# Patient Record
Sex: FEMALE | Race: WHITE | Employment: UNEMPLOYED | URBAN - METROPOLITAN AREA
[De-identification: names, ages, dates, MRNs, and addresses within clinical notes are randomized per-mention and may not be internally consistent; named-entity substitution may affect disease eponyms.]

---

## 2022-01-01 ENCOUNTER — APPOINTMENT (OUTPATIENT)
Dept: INTERVENTIONAL RADIOLOGY/VASCULAR | Age: 57
DRG: 388 | End: 2022-01-01
Attending: COLON & RECTAL SURGERY
Payer: COMMERCIAL

## 2022-01-01 ENCOUNTER — ANESTHESIA EVENT (OUTPATIENT)
Dept: CARDIOLOGY UNIT | Age: 57
DRG: 388 | End: 2022-01-01
Payer: COMMERCIAL

## 2022-01-01 ENCOUNTER — APPOINTMENT (OUTPATIENT)
Dept: CT IMAGING | Age: 57
DRG: 388 | End: 2022-01-01
Attending: HOSPITALIST
Payer: COMMERCIAL

## 2022-01-01 ENCOUNTER — APPOINTMENT (OUTPATIENT)
Dept: ULTRASOUND IMAGING | Age: 57
DRG: 388 | End: 2022-01-01
Attending: INTERNAL MEDICINE
Payer: COMMERCIAL

## 2022-01-01 ENCOUNTER — APPOINTMENT (OUTPATIENT)
Dept: CT IMAGING | Age: 57
DRG: 388 | End: 2022-01-01
Attending: STUDENT IN AN ORGANIZED HEALTH CARE EDUCATION/TRAINING PROGRAM
Payer: COMMERCIAL

## 2022-01-01 ENCOUNTER — APPOINTMENT (OUTPATIENT)
Dept: GENERAL RADIOLOGY | Age: 57
DRG: 388 | End: 2022-01-01
Attending: COLON & RECTAL SURGERY
Payer: COMMERCIAL

## 2022-01-01 ENCOUNTER — APPOINTMENT (OUTPATIENT)
Dept: GENERAL RADIOLOGY | Age: 57
DRG: 388 | End: 2022-01-01
Attending: HOSPITALIST
Payer: COMMERCIAL

## 2022-01-01 ENCOUNTER — HOSPITAL ENCOUNTER (INPATIENT)
Age: 57
LOS: 2 days | DRG: 388 | End: 2022-09-21
Attending: STUDENT IN AN ORGANIZED HEALTH CARE EDUCATION/TRAINING PROGRAM | Admitting: COLON & RECTAL SURGERY
Payer: COMMERCIAL

## 2022-01-01 ENCOUNTER — ANESTHESIA (OUTPATIENT)
Dept: CARDIOLOGY UNIT | Age: 57
DRG: 388 | End: 2022-01-01
Payer: COMMERCIAL

## 2022-01-01 ENCOUNTER — APPOINTMENT (OUTPATIENT)
Dept: GENERAL RADIOLOGY | Age: 57
DRG: 388 | End: 2022-01-01
Attending: INTERNAL MEDICINE
Payer: COMMERCIAL

## 2022-01-01 VITALS
WEIGHT: 218.03 LBS | SYSTOLIC BLOOD PRESSURE: 81 MMHG | HEART RATE: 50 BPM | BODY MASS INDEX: 32.29 KG/M2 | RESPIRATION RATE: 36 BRPM | TEMPERATURE: 99.6 F | HEIGHT: 69 IN | DIASTOLIC BLOOD PRESSURE: 27 MMHG | OXYGEN SATURATION: 49 %

## 2022-01-01 DIAGNOSIS — K56.7 ILEUS OF UNSPECIFIED TYPE (HCC): Primary | ICD-10-CM

## 2022-01-01 LAB
ALBUMIN SERPL-MCNC: 1.6 G/DL (ref 3.5–5)
ALBUMIN SERPL-MCNC: 2.1 G/DL (ref 3.5–5)
ALBUMIN SERPL-MCNC: 3 G/DL (ref 3.5–5)
ALBUMIN SERPL-MCNC: 3.5 G/DL (ref 3.5–5)
ALBUMIN/GLOB SERPL: 0.7 {RATIO} (ref 1.1–2.2)
ALBUMIN/GLOB SERPL: 0.7 {RATIO} (ref 1.1–2.2)
ALBUMIN/GLOB SERPL: 1 {RATIO} (ref 1.1–2.2)
ALBUMIN/GLOB SERPL: 1 {RATIO} (ref 1.1–2.2)
ALBUMIN/GLOB SERPL: 1.1 {RATIO} (ref 1.1–2.2)
ALBUMIN/GLOB SERPL: 1.2 {RATIO} (ref 1.1–2.2)
ALP SERPL-CCNC: 130 U/L (ref 45–117)
ALP SERPL-CCNC: 199 U/L (ref 45–117)
ALP SERPL-CCNC: 239 U/L (ref 45–117)
ALP SERPL-CCNC: 379 U/L (ref 45–117)
ALP SERPL-CCNC: 393 U/L (ref 45–117)
ALP SERPL-CCNC: 93 U/L (ref 45–117)
ALT SERPL-CCNC: 209 U/L (ref 12–78)
ALT SERPL-CCNC: 271 U/L (ref 12–78)
ALT SERPL-CCNC: 62 U/L (ref 12–78)
ALT SERPL-CCNC: 71 U/L (ref 12–78)
ALT SERPL-CCNC: 728 U/L (ref 12–78)
ALT SERPL-CCNC: 732 U/L (ref 12–78)
AMMONIA PLAS-SCNC: 239 UMOL/L
AMMONIA PLAS-SCNC: 56 UMOL/L
AMPHET UR QL SCN: NEGATIVE
ANION GAP SERPL CALC-SCNC: 12 MMOL/L (ref 5–15)
ANION GAP SERPL CALC-SCNC: 12 MMOL/L (ref 5–15)
ANION GAP SERPL CALC-SCNC: 13 MMOL/L (ref 5–15)
ANION GAP SERPL CALC-SCNC: 14 MMOL/L (ref 5–15)
ANION GAP SERPL CALC-SCNC: 14 MMOL/L (ref 5–15)
ANION GAP SERPL CALC-SCNC: 16 MMOL/L (ref 5–15)
ANION GAP SERPL CALC-SCNC: 19 MMOL/L (ref 5–15)
APAP SERPL-MCNC: <2 UG/ML (ref 10–30)
APPEARANCE UR: ABNORMAL
ARTERIAL PATENCY WRIST A: ABNORMAL
ARTERIAL PATENCY WRIST A: YES
AST SERPL W P-5'-P-CCNC: 41 U/L (ref 15–37)
AST SERPL W P-5'-P-CCNC: 545 U/L (ref 15–37)
AST SERPL W P-5'-P-CCNC: 778 U/L (ref 15–37)
AST SERPL W P-5'-P-CCNC: 96 U/L (ref 15–37)
AST SERPL W P-5'-P-CCNC: >2000 U/L (ref 15–37)
AST SERPL W P-5'-P-CCNC: >2000 U/L (ref 15–37)
BACTERIA URNS QL MICRO: ABNORMAL /HPF
BARBITURATES UR QL SCN: NEGATIVE
BASE DEFICIT BLDA-SCNC: 13.4 MMOL/L
BASE DEFICIT BLDA-SCNC: 13.9 MMOL/L
BASE DEFICIT BLDA-SCNC: 15 MMOL/L
BASE DEFICIT BLDA-SCNC: 22.2 MMOL/L
BASOPHILS # BLD: 0 K/UL (ref 0–0.1)
BASOPHILS # BLD: 0.1 K/UL (ref 0–0.1)
BASOPHILS NFR BLD: 0 % (ref 0–1)
BASOPHILS NFR BLD: 1 % (ref 0–1)
BDY SITE: ABNORMAL
BENZODIAZ UR QL: NEGATIVE
BILIRUB DIRECT SERPL-MCNC: 1.6 MG/DL (ref 0–0.2)
BILIRUB SERPL-MCNC: 0.9 MG/DL (ref 0.2–1)
BILIRUB SERPL-MCNC: 1 MG/DL (ref 0.2–1)
BILIRUB SERPL-MCNC: 1.4 MG/DL (ref 0.2–1)
BILIRUB SERPL-MCNC: 1.6 MG/DL (ref 0.2–1)
BILIRUB SERPL-MCNC: 2.1 MG/DL (ref 0.2–1)
BILIRUB SERPL-MCNC: 2.1 MG/DL (ref 0.2–1)
BILIRUB UR QL: ABNORMAL
BODY TEMPERATURE: 100.3
BODY TEMPERATURE: 100.3
BODY TEMPERATURE: 102.7
BODY TEMPERATURE: 98.6
BUN SERPL-MCNC: 21 MG/DL (ref 6–20)
BUN SERPL-MCNC: 34 MG/DL (ref 6–20)
BUN SERPL-MCNC: 40 MG/DL (ref 6–20)
BUN SERPL-MCNC: 52 MG/DL (ref 6–20)
BUN SERPL-MCNC: 53 MG/DL (ref 6–20)
BUN SERPL-MCNC: 57 MG/DL (ref 6–20)
BUN SERPL-MCNC: 59 MG/DL (ref 6–20)
BUN SERPL-MCNC: 61 MG/DL (ref 6–20)
BUN SERPL-MCNC: 62 MG/DL (ref 6–20)
BUN/CREAT SERPL: 12 (ref 12–20)
BUN/CREAT SERPL: 12 (ref 12–20)
BUN/CREAT SERPL: 14 (ref 12–20)
BUN/CREAT SERPL: 15 (ref 12–20)
BUN/CREAT SERPL: 16 (ref 12–20)
CA-I BLD-MCNC: 11 MG/DL (ref 8.5–10.1)
CA-I BLD-MCNC: 6.5 MG/DL (ref 8.5–10.1)
CA-I BLD-MCNC: 7.3 MG/DL (ref 8.5–10.1)
CA-I BLD-MCNC: 7.4 MG/DL (ref 8.5–10.1)
CA-I BLD-MCNC: 7.5 MG/DL (ref 8.5–10.1)
CA-I BLD-MCNC: 7.5 MG/DL (ref 8.5–10.1)
CA-I BLD-MCNC: 7.6 MG/DL (ref 8.5–10.1)
CA-I BLD-MCNC: 8.7 MG/DL (ref 8.5–10.1)
CA-I BLD-MCNC: 9.8 MG/DL (ref 8.5–10.1)
CANNABINOIDS UR QL SCN: POSITIVE
CHLORIDE SERPL-SCNC: 102 MMOL/L (ref 97–108)
CHLORIDE SERPL-SCNC: 103 MMOL/L (ref 97–108)
CHLORIDE SERPL-SCNC: 104 MMOL/L (ref 97–108)
CHLORIDE SERPL-SCNC: 104 MMOL/L (ref 97–108)
CHLORIDE SERPL-SCNC: 107 MMOL/L (ref 97–108)
CK SERPL-CCNC: ABNORMAL U/L (ref 26–192)
CO2 SERPL-SCNC: 14 MMOL/L (ref 21–32)
CO2 SERPL-SCNC: 15 MMOL/L (ref 21–32)
CO2 SERPL-SCNC: 18 MMOL/L (ref 21–32)
CO2 SERPL-SCNC: 18 MMOL/L (ref 21–32)
CO2 SERPL-SCNC: 19 MMOL/L (ref 21–32)
CO2 SERPL-SCNC: 19 MMOL/L (ref 21–32)
CO2 SERPL-SCNC: 21 MMOL/L (ref 21–32)
COCAINE UR QL SCN: NEGATIVE
COHGB MFR BLD: 0.9 % (ref 1–2)
COHGB MFR BLD: 1 % (ref 1–2)
COHGB MFR BLD: 1.2 % (ref 1–2)
COHGB MFR BLD: 1.7 % (ref 1–2)
COLOR UR: ABNORMAL
CREAT SERPL-MCNC: 1.82 MG/DL (ref 0.55–1.02)
CREAT SERPL-MCNC: 2.75 MG/DL (ref 0.55–1.02)
CREAT SERPL-MCNC: 2.87 MG/DL (ref 0.55–1.02)
CREAT SERPL-MCNC: 3.32 MG/DL (ref 0.55–1.02)
CREAT SERPL-MCNC: 3.33 MG/DL (ref 0.55–1.02)
CREAT SERPL-MCNC: 3.65 MG/DL (ref 0.55–1.02)
CREAT SERPL-MCNC: 3.95 MG/DL (ref 0.55–1.02)
CREAT SERPL-MCNC: 4.51 MG/DL (ref 0.55–1.02)
CREAT SERPL-MCNC: 4.52 MG/DL (ref 0.55–1.02)
DIFFERENTIAL METHOD BLD: ABNORMAL
DRUG SCRN COMMENT,DRGCM: ABNORMAL
EOSINOPHIL # BLD: 0 K/UL (ref 0–0.4)
EOSINOPHIL NFR BLD: 0 % (ref 0–7)
ERYTHROCYTE [DISTWIDTH] IN BLOOD BY AUTOMATED COUNT: 13.6 % (ref 11.5–14.5)
ERYTHROCYTE [DISTWIDTH] IN BLOOD BY AUTOMATED COUNT: 13.7 % (ref 11.5–14.5)
ERYTHROCYTE [DISTWIDTH] IN BLOOD BY AUTOMATED COUNT: 13.8 % (ref 11.5–14.5)
ERYTHROCYTE [DISTWIDTH] IN BLOOD BY AUTOMATED COUNT: 13.8 % (ref 11.5–14.5)
ERYTHROCYTE [DISTWIDTH] IN BLOOD BY AUTOMATED COUNT: 14.5 % (ref 11.5–14.5)
EST. AVERAGE GLUCOSE BLD GHB EST-MCNC: 140 MG/DL
FIO2 ON VENT: 100 %
FIO2 ON VENT: 100 %
FIO2 ON VENT: 50 %
FIO2 ON VENT: 50 %
GAS FLOW.O2 SETTING OXYMISER: 18 L/MIN
GAS FLOW.O2 SETTING OXYMISER: 30 L/MIN
GLOBULIN SER CALC-MCNC: 2 G/DL (ref 2–4)
GLOBULIN SER CALC-MCNC: 2.1 G/DL (ref 2–4)
GLOBULIN SER CALC-MCNC: 2.3 G/DL (ref 2–4)
GLOBULIN SER CALC-MCNC: 2.4 G/DL (ref 2–4)
GLOBULIN SER CALC-MCNC: 2.6 G/DL (ref 2–4)
GLOBULIN SER CALC-MCNC: 3.4 G/DL (ref 2–4)
GLUCOSE BLD STRIP.AUTO-MCNC: 120 MG/DL (ref 65–100)
GLUCOSE BLD STRIP.AUTO-MCNC: 139 MG/DL (ref 65–100)
GLUCOSE BLD STRIP.AUTO-MCNC: 174 MG/DL (ref 65–100)
GLUCOSE BLD STRIP.AUTO-MCNC: 266 MG/DL (ref 65–100)
GLUCOSE BLD STRIP.AUTO-MCNC: 30 MG/DL (ref 65–100)
GLUCOSE BLD STRIP.AUTO-MCNC: 34 MG/DL (ref 65–100)
GLUCOSE BLD STRIP.AUTO-MCNC: 35 MG/DL (ref 65–100)
GLUCOSE BLD STRIP.AUTO-MCNC: 38 MG/DL (ref 65–100)
GLUCOSE BLD STRIP.AUTO-MCNC: 392 MG/DL (ref 65–100)
GLUCOSE BLD STRIP.AUTO-MCNC: 44 MG/DL (ref 65–100)
GLUCOSE BLD STRIP.AUTO-MCNC: 46 MG/DL (ref 65–100)
GLUCOSE BLD STRIP.AUTO-MCNC: 69 MG/DL (ref 65–100)
GLUCOSE BLD STRIP.AUTO-MCNC: 79 MG/DL (ref 65–100)
GLUCOSE BLD STRIP.AUTO-MCNC: 84 MG/DL (ref 65–100)
GLUCOSE BLD STRIP.AUTO-MCNC: <10 MG/DL (ref 65–100)
GLUCOSE SERPL-MCNC: 227 MG/DL (ref 65–100)
GLUCOSE SERPL-MCNC: 262 MG/DL (ref 65–100)
GLUCOSE SERPL-MCNC: 315 MG/DL (ref 65–100)
GLUCOSE SERPL-MCNC: 397 MG/DL (ref 65–100)
GLUCOSE SERPL-MCNC: 65 MG/DL (ref 65–100)
GLUCOSE SERPL-MCNC: 66 MG/DL (ref 65–100)
GLUCOSE SERPL-MCNC: 67 MG/DL (ref 65–100)
GLUCOSE SERPL-MCNC: 96 MG/DL (ref 65–100)
GLUCOSE SERPL-MCNC: 98 MG/DL (ref 65–100)
GLUCOSE UR STRIP.AUTO-MCNC: 100 MG/DL
HBA1C MFR BLD: 6.5 % (ref 4–5.6)
HCO3 BLDA-SCNC: 14 MMOL/L (ref 22–26)
HCO3 BLDA-SCNC: 15 MMOL/L (ref 22–26)
HCO3 BLDA-SCNC: 16 MMOL/L (ref 22–26)
HCO3 BLDA-SCNC: 8 MMOL/L (ref 22–26)
HCT VFR BLD AUTO: 35.2 % (ref 35–47)
HCT VFR BLD AUTO: 42.7 % (ref 35–47)
HCT VFR BLD AUTO: 42.7 % (ref 35–47)
HCT VFR BLD AUTO: 50.1 % (ref 35–47)
HCT VFR BLD AUTO: 53.5 % (ref 35–47)
HCT VFR BLD AUTO: 55.7 % (ref 35–47)
HGB BLD-MCNC: 11.6 G/DL (ref 11.5–16)
HGB BLD-MCNC: 14.3 G/DL (ref 11.5–16)
HGB BLD-MCNC: 14.6 G/DL (ref 11.5–16)
HGB BLD-MCNC: 16.8 G/DL (ref 11.5–16)
HGB BLD-MCNC: 18.2 G/DL (ref 11.5–16)
HGB BLD-MCNC: 19 G/DL (ref 11.5–16)
HGB UR QL STRIP: ABNORMAL
IMM GRANULOCYTES # BLD AUTO: 0 K/UL
IMM GRANULOCYTES # BLD AUTO: 0 K/UL (ref 0–0.04)
IMM GRANULOCYTES NFR BLD AUTO: 0 %
IMM GRANULOCYTES NFR BLD AUTO: 0 % (ref 0–0.5)
KETONES UR QL STRIP.AUTO: 15 MG/DL
LACTATE SERPL-SCNC: 14.3 MMOL/L (ref 0.4–2)
LACTATE SERPL-SCNC: 5.8 MMOL/L (ref 0.4–2)
LACTATE SERPL-SCNC: 5.9 MMOL/L (ref 0.4–2)
LACTATE SERPL-SCNC: 7.7 MMOL/L (ref 0.4–2)
LEUKOCYTE ESTERASE UR QL STRIP.AUTO: ABNORMAL
LIPASE SERPL-CCNC: 23 U/L (ref 73–393)
LYMPHOCYTES # BLD: 0.7 K/UL (ref 0.8–3.5)
LYMPHOCYTES # BLD: 0.7 K/UL (ref 0.8–3.5)
LYMPHOCYTES # BLD: 1.3 K/UL (ref 0.8–3.5)
LYMPHOCYTES # BLD: 1.5 K/UL (ref 0.8–3.5)
LYMPHOCYTES NFR BLD: 14 % (ref 12–49)
LYMPHOCYTES NFR BLD: 18 % (ref 12–49)
LYMPHOCYTES NFR BLD: 19 % (ref 12–49)
LYMPHOCYTES NFR BLD: 24 % (ref 12–49)
MAGNESIUM SERPL-MCNC: 2.1 MG/DL (ref 1.6–2.4)
MCH RBC QN AUTO: 29.9 PG (ref 26–34)
MCH RBC QN AUTO: 30.2 PG (ref 26–34)
MCH RBC QN AUTO: 30.4 PG (ref 26–34)
MCH RBC QN AUTO: 30.8 PG (ref 26–34)
MCH RBC QN AUTO: 30.9 PG (ref 26–34)
MCHC RBC AUTO-ENTMCNC: 33 G/DL (ref 30–36.5)
MCHC RBC AUTO-ENTMCNC: 33.5 G/DL (ref 30–36.5)
MCHC RBC AUTO-ENTMCNC: 34 G/DL (ref 30–36.5)
MCHC RBC AUTO-ENTMCNC: 34.1 G/DL (ref 30–36.5)
MCHC RBC AUTO-ENTMCNC: 34.2 G/DL (ref 30–36.5)
MCV RBC AUTO: 88.4 FL (ref 80–99)
MCV RBC AUTO: 89.3 FL (ref 80–99)
MCV RBC AUTO: 89.5 FL (ref 80–99)
MCV RBC AUTO: 90.3 FL (ref 80–99)
MCV RBC AUTO: 93.6 FL (ref 80–99)
METAMYELOCYTES NFR BLD MANUAL: 15 %
METAMYELOCYTES NFR BLD MANUAL: 20 %
METHADONE UR QL: NEGATIVE
METHGB MFR BLD: 0 % (ref 0–1.4)
METHGB MFR BLD: 0 % (ref 0–1.4)
METHGB MFR BLD: 0.3 % (ref 0–1.4)
METHGB MFR BLD: 0.3 % (ref 0–1.4)
MONOCYTES # BLD: 0.1 K/UL (ref 0–1)
MONOCYTES # BLD: 0.2 K/UL (ref 0–1)
MONOCYTES # BLD: 0.3 K/UL (ref 0–1)
MONOCYTES # BLD: 0.6 K/UL (ref 0–1)
MONOCYTES NFR BLD: 10 % (ref 5–13)
MONOCYTES NFR BLD: 2 % (ref 5–13)
MONOCYTES NFR BLD: 3 % (ref 5–13)
MONOCYTES NFR BLD: 6 % (ref 5–13)
MYELOCYTES NFR BLD MANUAL: 3 %
MYELOCYTES NFR BLD MANUAL: 6 %
NEUTS BAND NFR BLD MANUAL: 10 % (ref 0–6)
NEUTS BAND NFR BLD MANUAL: 7 % (ref 0–6)
NEUTS SEG # BLD: 1.9 K/UL (ref 1.8–8)
NEUTS SEG # BLD: 2.3 K/UL (ref 1.8–8)
NEUTS SEG # BLD: 3.9 K/UL (ref 1.8–8)
NEUTS SEG # BLD: 8 K/UL (ref 1.8–8)
NEUTS SEG NFR BLD: 45 % (ref 32–75)
NEUTS SEG NFR BLD: 52 % (ref 32–75)
NEUTS SEG NFR BLD: 66 % (ref 32–75)
NEUTS SEG NFR BLD: 79 % (ref 32–75)
NITRITE UR QL STRIP.AUTO: NEGATIVE
NRBC # BLD: 0 K/UL (ref 0–0.01)
NRBC # BLD: 0.04 K/UL (ref 0–0.01)
NRBC BLD-RTO: 0 PER 100 WBC
NRBC BLD-RTO: 1 PER 100 WBC
OPIATES UR QL: NEGATIVE
OXYHGB MFR BLD: 93.1 % (ref 95–99)
OXYHGB MFR BLD: 93.9 % (ref 95–99)
OXYHGB MFR BLD: 95 % (ref 95–99)
OXYHGB MFR BLD: 97.7 % (ref 95–99)
PCO2 BLDA: 37 MMHG (ref 35–45)
PCO2 BLDA: 43 MMHG (ref 35–45)
PCO2 BLDA: 46 MMHG (ref 35–45)
PCO2 BLDA: 61 MMHG (ref 35–45)
PCP UR QL: NEGATIVE
PEEP RESPIRATORY: 5 CM[H2O]
PERFORMED BY, TECHID: ABNORMAL
PERFORMED BY, TECHID: NORMAL
PH BLDA: 6.98 [PH] (ref 7.35–7.45)
PH BLDA: 7.06 [PH] (ref 7.35–7.45)
PH BLDA: 7.1 [PH] (ref 7.35–7.45)
PH BLDA: 7.15 [PH] (ref 7.35–7.45)
PH UR STRIP: 5 [PH]
PHOSPHATE SERPL-MCNC: 11.3 MG/DL (ref 2.6–4.7)
PHOSPHATE SERPL-MCNC: 4.4 MG/DL (ref 2.6–4.7)
PHOSPHATE SERPL-MCNC: 8.2 MG/DL (ref 2.6–4.7)
PLATELET # BLD AUTO: 142 K/UL (ref 150–400)
PLATELET # BLD AUTO: 229 K/UL (ref 150–400)
PLATELET # BLD AUTO: 53 K/UL (ref 150–400)
PLATELET # BLD AUTO: 76 K/UL (ref 150–400)
PLATELET # BLD AUTO: 87 K/UL (ref 150–400)
PMV BLD AUTO: 10 FL (ref 8.9–12.9)
PMV BLD AUTO: 10.9 FL (ref 8.9–12.9)
PMV BLD AUTO: 11 FL (ref 8.9–12.9)
PMV BLD AUTO: 11.4 FL (ref 8.9–12.9)
PMV BLD AUTO: 11.5 FL (ref 8.9–12.9)
PO2 BLDA: 116 MMHG (ref 80–100)
PO2 BLDA: 195 MMHG (ref 80–100)
PO2 BLDA: 84 MMHG (ref 80–100)
PO2 BLDA: 98 MMHG (ref 80–100)
POTASSIUM SERPL-SCNC: 3.9 MMOL/L (ref 3.5–5.1)
POTASSIUM SERPL-SCNC: 4.6 MMOL/L (ref 3.5–5.1)
POTASSIUM SERPL-SCNC: 5.2 MMOL/L (ref 3.5–5.1)
POTASSIUM SERPL-SCNC: 6 MMOL/L (ref 3.5–5.1)
POTASSIUM SERPL-SCNC: 7.3 MMOL/L (ref 3.5–5.1)
POTASSIUM SERPL-SCNC: 8.9 MMOL/L (ref 3.5–5.1)
POTASSIUM SERPL-SCNC: 8.9 MMOL/L (ref 3.5–5.1)
PROT SERPL-MCNC: 3.9 G/DL (ref 6.4–8.2)
PROT SERPL-MCNC: 4 G/DL (ref 6.4–8.2)
PROT SERPL-MCNC: 4.1 G/DL (ref 6.4–8.2)
PROT SERPL-MCNC: 4.2 G/DL (ref 6.4–8.2)
PROT SERPL-MCNC: 5.6 G/DL (ref 6.4–8.2)
PROT SERPL-MCNC: 6.9 G/DL (ref 6.4–8.2)
PROT UR STRIP-MCNC: 100 MG/DL
RBC # BLD AUTO: 3.76 M/UL (ref 3.8–5.2)
RBC # BLD AUTO: 4.78 M/UL (ref 3.8–5.2)
RBC # BLD AUTO: 4.83 M/UL (ref 3.8–5.2)
RBC # BLD AUTO: 5.98 M/UL (ref 3.8–5.2)
RBC # BLD AUTO: 6.17 M/UL (ref 3.8–5.2)
RBC #/AREA URNS HPF: ABNORMAL /HPF (ref 0–5)
RBC MORPH BLD: ABNORMAL
SAO2 % BLD: 95 % (ref 95–99)
SAO2 % BLD: 95 % (ref 95–99)
SAO2 % BLD: 96 % (ref 95–99)
SAO2 % BLD: 99 % (ref 95–99)
SAO2% DEVICE SAO2% SENSOR NAME: ABNORMAL
SERVICE CMNT-IMP: ABNORMAL
SODIUM SERPL-SCNC: 133 MMOL/L (ref 136–145)
SODIUM SERPL-SCNC: 135 MMOL/L (ref 136–145)
SODIUM SERPL-SCNC: 136 MMOL/L (ref 136–145)
SODIUM SERPL-SCNC: 136 MMOL/L (ref 136–145)
SODIUM SERPL-SCNC: 137 MMOL/L (ref 136–145)
SODIUM SERPL-SCNC: 137 MMOL/L (ref 136–145)
SODIUM SERPL-SCNC: 138 MMOL/L (ref 136–145)
SODIUM SERPL-SCNC: 139 MMOL/L (ref 136–145)
SODIUM SERPL-SCNC: 139 MMOL/L (ref 136–145)
SP GR UR REFRACTOMETRY: 1.02 (ref 1–1.03)
SPECIMEN SITE: ABNORMAL
UROBILINOGEN UR QL STRIP.AUTO: 4 EU/DL (ref 0.2–1)
VENTILATION MODE VENT: ABNORMAL
VT SETTING VENT: 500 ML
VT SETTING VENT: 550 ML
WBC # BLD AUTO: 10.2 K/UL (ref 3.6–11)
WBC # BLD AUTO: 2.9 K/UL (ref 3.6–11)
WBC # BLD AUTO: 3.7 K/UL (ref 3.6–11)
WBC # BLD AUTO: 3.9 K/UL (ref 3.6–11)
WBC # BLD AUTO: 7.1 K/UL (ref 3.6–11)
WBC URNS QL MICRO: ABNORMAL /HPF (ref 0–4)
YEAST URNS QL MICRO: PRESENT

## 2022-01-01 PROCEDURE — 99285 EMERGENCY DEPT VISIT HI MDM: CPT

## 2022-01-01 PROCEDURE — 80069 RENAL FUNCTION PANEL: CPT

## 2022-01-01 PROCEDURE — 80053 COMPREHEN METABOLIC PANEL: CPT

## 2022-01-01 PROCEDURE — 82803 BLOOD GASES ANY COMBINATION: CPT

## 2022-01-01 PROCEDURE — 82962 GLUCOSE BLOOD TEST: CPT

## 2022-01-01 PROCEDURE — 74011250636 HC RX REV CODE- 250/636: Performed by: COLON & RECTAL SURGERY

## 2022-01-01 PROCEDURE — C1894 INTRO/SHEATH, NON-LASER: HCPCS

## 2022-01-01 PROCEDURE — 77010033678 HC OXYGEN DAILY

## 2022-01-01 PROCEDURE — 74011000250 HC RX REV CODE- 250: Performed by: HOSPITALIST

## 2022-01-01 PROCEDURE — 74176 CT ABD & PELVIS W/O CONTRAST: CPT

## 2022-01-01 PROCEDURE — 74011000250 HC RX REV CODE- 250: Performed by: INTERNAL MEDICINE

## 2022-01-01 PROCEDURE — 80143 DRUG ASSAY ACETAMINOPHEN: CPT

## 2022-01-01 PROCEDURE — 83690 ASSAY OF LIPASE: CPT

## 2022-01-01 PROCEDURE — 74011000250 HC RX REV CODE- 250: Performed by: COLON & RECTAL SURGERY

## 2022-01-01 PROCEDURE — 74011000258 HC RX REV CODE- 258: Performed by: INTERNAL MEDICINE

## 2022-01-01 PROCEDURE — 76937 US GUIDE VASCULAR ACCESS: CPT

## 2022-01-01 PROCEDURE — 87040 BLOOD CULTURE FOR BACTERIA: CPT

## 2022-01-01 PROCEDURE — 82550 ASSAY OF CK (CPK): CPT

## 2022-01-01 PROCEDURE — 5A1935Z RESPIRATORY VENTILATION, LESS THAN 24 CONSECUTIVE HOURS: ICD-10-PCS | Performed by: INTERNAL MEDICINE

## 2022-01-01 PROCEDURE — 82140 ASSAY OF AMMONIA: CPT

## 2022-01-01 PROCEDURE — 36415 COLL VENOUS BLD VENIPUNCTURE: CPT

## 2022-01-01 PROCEDURE — 85018 HEMOGLOBIN: CPT

## 2022-01-01 PROCEDURE — 80048 BASIC METABOLIC PNL TOTAL CA: CPT

## 2022-01-01 PROCEDURE — C9113 INJ PANTOPRAZOLE SODIUM, VIA: HCPCS | Performed by: INTERNAL MEDICINE

## 2022-01-01 PROCEDURE — 0BH17EZ INSERTION OF ENDOTRACHEAL AIRWAY INTO TRACHEA, VIA NATURAL OR ARTIFICIAL OPENING: ICD-10-PCS | Performed by: INTERNAL MEDICINE

## 2022-01-01 PROCEDURE — 71045 X-RAY EXAM CHEST 1 VIEW: CPT

## 2022-01-01 PROCEDURE — 94002 VENT MGMT INPAT INIT DAY: CPT

## 2022-01-01 PROCEDURE — 74011636637 HC RX REV CODE- 636/637: Performed by: COLON & RECTAL SURGERY

## 2022-01-01 PROCEDURE — 85027 COMPLETE CBC AUTOMATED: CPT

## 2022-01-01 PROCEDURE — 85025 COMPLETE CBC W/AUTO DIFF WBC: CPT

## 2022-01-01 PROCEDURE — 74011000258 HC RX REV CODE- 258: Performed by: COLON & RECTAL SURGERY

## 2022-01-01 PROCEDURE — 74011250636 HC RX REV CODE- 250/636: Performed by: INTERNAL MEDICINE

## 2022-01-01 PROCEDURE — 74011000250 HC RX REV CODE- 250

## 2022-01-01 PROCEDURE — P9047 ALBUMIN (HUMAN), 25%, 50ML: HCPCS | Performed by: INTERNAL MEDICINE

## 2022-01-01 PROCEDURE — 99221 1ST HOSP IP/OBS SF/LOW 40: CPT | Performed by: COLON & RECTAL SURGERY

## 2022-01-01 PROCEDURE — 74011250636 HC RX REV CODE- 250/636: Performed by: STUDENT IN AN ORGANIZED HEALTH CARE EDUCATION/TRAINING PROGRAM

## 2022-01-01 PROCEDURE — 65270000029 HC RM PRIVATE

## 2022-01-01 PROCEDURE — 83605 ASSAY OF LACTIC ACID: CPT

## 2022-01-01 PROCEDURE — 74018 RADEX ABDOMEN 1 VIEW: CPT

## 2022-01-01 PROCEDURE — 04HK33Z INSERTION OF INFUSION DEVICE INTO RIGHT FEMORAL ARTERY, PERCUTANEOUS APPROACH: ICD-10-PCS | Performed by: INTERNAL MEDICINE

## 2022-01-01 PROCEDURE — 74011636637 HC RX REV CODE- 636/637: Performed by: INTERNAL MEDICINE

## 2022-01-01 PROCEDURE — 02H633Z INSERTION OF INFUSION DEVICE INTO RIGHT ATRIUM, PERCUTANEOUS APPROACH: ICD-10-PCS | Performed by: RADIOLOGY

## 2022-01-01 PROCEDURE — 83735 ASSAY OF MAGNESIUM: CPT

## 2022-01-01 PROCEDURE — 99291 CRITICAL CARE FIRST HOUR: CPT | Performed by: COLON & RECTAL SURGERY

## 2022-01-01 PROCEDURE — 74011636637 HC RX REV CODE- 636/637: Performed by: HOSPITALIST

## 2022-01-01 PROCEDURE — 83036 HEMOGLOBIN GLYCOSYLATED A1C: CPT

## 2022-01-01 PROCEDURE — 36600 WITHDRAWAL OF ARTERIAL BLOOD: CPT

## 2022-01-01 PROCEDURE — 76700 US EXAM ABDOM COMPLETE: CPT

## 2022-01-01 PROCEDURE — 74011250636 HC RX REV CODE- 250/636: Performed by: HOSPITALIST

## 2022-01-01 PROCEDURE — 74011000258 HC RX REV CODE- 258: Performed by: HOSPITALIST

## 2022-01-01 PROCEDURE — 93005 ELECTROCARDIOGRAM TRACING: CPT

## 2022-01-01 PROCEDURE — 81001 URINALYSIS AUTO W/SCOPE: CPT

## 2022-01-01 PROCEDURE — 80307 DRUG TEST PRSMV CHEM ANLYZR: CPT

## 2022-01-01 PROCEDURE — 65610000006 HC RM INTENSIVE CARE

## 2022-01-01 PROCEDURE — 80076 HEPATIC FUNCTION PANEL: CPT

## 2022-01-01 RX ORDER — CLONAZEPAM 0.5 MG/1
0.5 TABLET ORAL
Status: DISCONTINUED | OUTPATIENT
Start: 2022-01-01 | End: 2022-01-01 | Stop reason: HOSPADM

## 2022-01-01 RX ORDER — HYDROCORTISONE SODIUM SUCCINATE 100 MG/2ML
50 INJECTION, POWDER, FOR SOLUTION INTRAMUSCULAR; INTRAVENOUS EVERY 6 HOURS
Status: DISCONTINUED | OUTPATIENT
Start: 2022-01-01 | End: 2022-01-01 | Stop reason: HOSPADM

## 2022-01-01 RX ORDER — SUCCINYLCHOLINE CHLORIDE 20 MG/ML INJECTION SOLUTION
SOLUTION AS NEEDED
Status: DISCONTINUED | OUTPATIENT
Start: 2022-01-01 | End: 2022-01-01 | Stop reason: HOSPADM

## 2022-01-01 RX ORDER — EPINEPHRINE 0.1 MG/ML
INJECTION INTRACARDIAC; INTRAVENOUS
Status: COMPLETED | OUTPATIENT
Start: 2022-01-01 | End: 2022-01-01

## 2022-01-01 RX ORDER — SODIUM CHLORIDE 9 MG/ML
75 INJECTION, SOLUTION INTRAVENOUS CONTINUOUS
Status: DISCONTINUED | OUTPATIENT
Start: 2022-01-01 | End: 2022-01-01

## 2022-01-01 RX ORDER — PROPOFOL 10 MG/ML
0-50 VIAL (ML) INTRAVENOUS
Status: DISCONTINUED | OUTPATIENT
Start: 2022-01-01 | End: 2022-01-01 | Stop reason: HOSPADM

## 2022-01-01 RX ORDER — MIDAZOLAM IN 0.9 % SOD.CHLORID 1 MG/ML
0-10 PLASTIC BAG, INJECTION (ML) INTRAVENOUS
Status: DISCONTINUED | OUTPATIENT
Start: 2022-01-01 | End: 2022-01-01 | Stop reason: HOSPADM

## 2022-01-01 RX ORDER — PROPOFOL 10 MG/ML
INJECTION, EMULSION INTRAVENOUS
Status: DISCONTINUED
Start: 2022-01-01 | End: 2022-01-01 | Stop reason: WASHOUT

## 2022-01-01 RX ORDER — ROCURONIUM BROMIDE 10 MG/ML
INJECTION, SOLUTION INTRAVENOUS
Status: COMPLETED
Start: 2022-01-01 | End: 2022-01-01

## 2022-01-01 RX ORDER — CALCIUM CHLORIDE INJECTION 100 MG/ML
INJECTION, SOLUTION INTRAVENOUS
Status: COMPLETED | OUTPATIENT
Start: 2022-01-01 | End: 2022-01-01

## 2022-01-01 RX ORDER — NOREPINEPHRINE BIT/0.9 % NACL 8 MG/250ML
.5-3 INFUSION BOTTLE (ML) INTRAVENOUS
Status: DISCONTINUED | OUTPATIENT
Start: 2022-01-01 | End: 2022-01-01

## 2022-01-01 RX ORDER — ALBUMIN HUMAN 250 G/1000ML
25 SOLUTION INTRAVENOUS EVERY 6 HOURS
Status: DISCONTINUED | OUTPATIENT
Start: 2022-01-01 | End: 2022-01-01 | Stop reason: HOSPADM

## 2022-01-01 RX ORDER — MAGNESIUM SULFATE 100 %
4 CRYSTALS MISCELLANEOUS AS NEEDED
Status: DISCONTINUED | OUTPATIENT
Start: 2022-01-01 | End: 2022-01-01

## 2022-01-01 RX ORDER — SODIUM BICARBONATE 1 MEQ/ML
SYRINGE (ML) INTRAVENOUS
Status: COMPLETED
Start: 2022-01-01 | End: 2022-01-01

## 2022-01-01 RX ORDER — SODIUM BICARBONATE 1 MEQ/ML
SYRINGE (ML) INTRAVENOUS
Status: DISCONTINUED
Start: 2022-01-01 | End: 2022-01-01 | Stop reason: WASHOUT

## 2022-01-01 RX ORDER — LIDOCAINE HCL/PF 100 MG/5ML
SYRINGE (ML) INTRAVENOUS
Status: COMPLETED
Start: 2022-01-01 | End: 2022-01-01

## 2022-01-01 RX ORDER — DEXTROSE MONOHYDRATE 100 MG/ML
0-250 INJECTION, SOLUTION INTRAVENOUS AS NEEDED
Status: DISCONTINUED | OUTPATIENT
Start: 2022-01-01 | End: 2022-01-01 | Stop reason: HOSPADM

## 2022-01-01 RX ORDER — SODIUM BICARBONATE 1 MEQ/ML
SYRINGE (ML) INTRAVENOUS
Status: COMPLETED | OUTPATIENT
Start: 2022-01-01 | End: 2022-01-01

## 2022-01-01 RX ORDER — SODIUM CHLORIDE, SODIUM LACTATE, POTASSIUM CHLORIDE, CALCIUM CHLORIDE 600; 310; 30; 20 MG/100ML; MG/100ML; MG/100ML; MG/100ML
125 INJECTION, SOLUTION INTRAVENOUS CONTINUOUS
Status: DISCONTINUED | OUTPATIENT
Start: 2022-01-01 | End: 2022-01-01

## 2022-01-01 RX ORDER — CHLORHEXIDINE GLUCONATE 1.2 MG/ML
15 RINSE ORAL EVERY 12 HOURS
Status: DISCONTINUED | OUTPATIENT
Start: 2022-01-01 | End: 2022-01-01 | Stop reason: HOSPADM

## 2022-01-01 RX ORDER — AMIODARONE HYDROCHLORIDE 150 MG/3ML
INJECTION, SOLUTION INTRAVENOUS
Status: COMPLETED | OUTPATIENT
Start: 2022-01-01 | End: 2022-01-01

## 2022-01-01 RX ORDER — SODIUM BICARBONATE 1 MEQ/ML
100 SYRINGE (ML) INTRAVENOUS ONCE
Status: COMPLETED | OUTPATIENT
Start: 2022-01-01 | End: 2022-01-01

## 2022-01-01 RX ORDER — SODIUM BICARBONATE 1 MEQ/ML
SYRINGE (ML) INTRAVENOUS
Status: DISCONTINUED
Start: 2022-01-01 | End: 2022-01-01 | Stop reason: HOSPADM

## 2022-01-01 RX ORDER — INSULIN LISPRO 100 [IU]/ML
INJECTION, SOLUTION INTRAVENOUS; SUBCUTANEOUS EVERY 4 HOURS
Status: DISCONTINUED | OUTPATIENT
Start: 2022-01-01 | End: 2022-01-01 | Stop reason: HOSPADM

## 2022-01-01 RX ORDER — CALCIUM CHLORIDE INJECTION 100 MG/ML
INJECTION, SOLUTION INTRAVENOUS
Status: DISCONTINUED
Start: 2022-01-01 | End: 2022-01-01 | Stop reason: HOSPADM

## 2022-01-01 RX ORDER — ETOMIDATE 2 MG/ML
INJECTION INTRAVENOUS AS NEEDED
Status: DISCONTINUED | OUTPATIENT
Start: 2022-01-01 | End: 2022-01-01 | Stop reason: HOSPADM

## 2022-01-01 RX ORDER — ROCURONIUM BROMIDE 10 MG/ML
INJECTION, SOLUTION INTRAVENOUS AS NEEDED
Status: DISCONTINUED | OUTPATIENT
Start: 2022-01-01 | End: 2022-01-01 | Stop reason: HOSPADM

## 2022-01-01 RX ORDER — INSULIN LISPRO 100 [IU]/ML
INJECTION, SOLUTION INTRAVENOUS; SUBCUTANEOUS
Status: DISCONTINUED | OUTPATIENT
Start: 2022-01-01 | End: 2022-01-01 | Stop reason: SDUPTHER

## 2022-01-01 RX ORDER — DIPHENHYDRAMINE HYDROCHLORIDE 50 MG/ML
12.5 INJECTION, SOLUTION INTRAMUSCULAR; INTRAVENOUS
Status: DISCONTINUED | OUTPATIENT
Start: 2022-01-01 | End: 2022-01-01 | Stop reason: HOSPADM

## 2022-01-01 RX ORDER — MIDAZOLAM HYDROCHLORIDE 1 MG/ML
4 INJECTION, SOLUTION INTRAMUSCULAR; INTRAVENOUS ONCE
Status: COMPLETED | OUTPATIENT
Start: 2022-01-01 | End: 2022-01-01

## 2022-01-01 RX ORDER — NOREPINEPHRINE BIT/0.9 % NACL 8 MG/250ML
.5-12 INFUSION BOTTLE (ML) INTRAVENOUS
Status: DISPENSED | OUTPATIENT
Start: 2022-01-01 | End: 2022-01-01

## 2022-01-01 RX ORDER — HYDROMORPHONE HYDROCHLORIDE 1 MG/ML
0.5 INJECTION, SOLUTION INTRAMUSCULAR; INTRAVENOUS; SUBCUTANEOUS
Status: DISCONTINUED | OUTPATIENT
Start: 2022-01-01 | End: 2022-01-01 | Stop reason: HOSPADM

## 2022-01-01 RX ORDER — PROPOFOL 10 MG/ML
0-50 VIAL (ML) INTRAVENOUS
Status: DISCONTINUED | OUTPATIENT
Start: 2022-01-01 | End: 2022-01-01

## 2022-01-01 RX ORDER — DEXTROSE 50 % IN WATER (D50W) INTRAVENOUS SYRINGE
Status: COMPLETED | OUTPATIENT
Start: 2022-01-01 | End: 2022-01-01

## 2022-01-01 RX ORDER — FLUCONAZOLE 2 MG/ML
200 INJECTION, SOLUTION INTRAVENOUS DAILY
Status: DISCONTINUED | OUTPATIENT
Start: 2022-01-01 | End: 2022-01-01 | Stop reason: HOSPADM

## 2022-01-01 RX ORDER — CALCIUM CHLORIDE INJECTION 100 MG/ML
INJECTION, SOLUTION INTRAVENOUS
Status: COMPLETED
Start: 2022-01-01 | End: 2022-01-01

## 2022-01-01 RX ORDER — DEXMEDETOMIDINE HYDROCHLORIDE 4 UG/ML
.1-1.5 INJECTION, SOLUTION INTRAVENOUS
Status: DISCONTINUED | OUTPATIENT
Start: 2022-01-01 | End: 2022-01-01 | Stop reason: HOSPADM

## 2022-01-01 RX ORDER — CALCIUM CHLORIDE INJECTION 100 MG/ML
1 INJECTION, SOLUTION INTRAVENOUS ONCE
Status: COMPLETED | OUTPATIENT
Start: 2022-01-01 | End: 2022-01-01

## 2022-01-01 RX ORDER — LIDOCAINE HYDROCHLORIDE 20 MG/ML
INJECTION, SOLUTION EPIDURAL; INFILTRATION; INTRACAUDAL; PERINEURAL AS NEEDED
Status: DISCONTINUED | OUTPATIENT
Start: 2022-01-01 | End: 2022-01-01 | Stop reason: HOSPADM

## 2022-01-01 RX ORDER — MAGNESIUM SULFATE 100 %
4 CRYSTALS MISCELLANEOUS AS NEEDED
Status: DISCONTINUED | OUTPATIENT
Start: 2022-01-01 | End: 2022-01-01 | Stop reason: HOSPADM

## 2022-01-01 RX ORDER — DEXTROSE MONOHYDRATE 100 MG/ML
INJECTION, SOLUTION INTRAVENOUS
Status: COMPLETED | OUTPATIENT
Start: 2022-01-01 | End: 2022-01-01

## 2022-01-01 RX ORDER — SUCCINYLCHOLINE CHLORIDE 20 MG/ML
INJECTION INTRAMUSCULAR; INTRAVENOUS
Status: COMPLETED
Start: 2022-01-01 | End: 2022-01-01

## 2022-01-01 RX ORDER — ETOMIDATE 2 MG/ML
INJECTION INTRAVENOUS
Status: COMPLETED
Start: 2022-01-01 | End: 2022-01-01

## 2022-01-01 RX ORDER — CALCIUM GLUCONATE 20 MG/ML
1 INJECTION, SOLUTION INTRAVENOUS ONCE
Status: DISCONTINUED | OUTPATIENT
Start: 2022-01-01 | End: 2022-01-01 | Stop reason: HOSPADM

## 2022-01-01 RX ORDER — DEXTROSE MONOHYDRATE 100 MG/ML
250 INJECTION, SOLUTION INTRAVENOUS ONCE
Status: COMPLETED | OUTPATIENT
Start: 2022-01-01 | End: 2022-01-01

## 2022-01-01 RX ADMIN — HYDROMORPHONE HYDROCHLORIDE 0.5 MG: 1 INJECTION, SOLUTION INTRAMUSCULAR; INTRAVENOUS; SUBCUTANEOUS at 03:17

## 2022-01-01 RX ADMIN — SODIUM BICARBONATE 100 MEQ: 84 INJECTION INTRAVENOUS at 12:56

## 2022-01-01 RX ADMIN — SODIUM CHLORIDE 1000 ML: 9 INJECTION, SOLUTION INTRAVENOUS at 23:46

## 2022-01-01 RX ADMIN — Medication 38 MCG/MIN: at 04:50

## 2022-01-01 RX ADMIN — SODIUM CHLORIDE 75 ML/HR: 9 INJECTION, SOLUTION INTRAVENOUS at 14:06

## 2022-01-01 RX ADMIN — LIDOCAINE HYDROCHLORIDE 100 MG: 20 INJECTION, SOLUTION EPIDURAL; INFILTRATION; INTRACAUDAL; PERINEURAL at 06:30

## 2022-01-01 RX ADMIN — SODIUM CHLORIDE, PRESERVATIVE FREE 1 G: 5 INJECTION INTRAVENOUS at 17:46

## 2022-01-01 RX ADMIN — Medication 60 MCG/MIN: at 10:49

## 2022-01-01 RX ADMIN — SODIUM CHLORIDE, PRESERVATIVE FREE 80 MG: 5 INJECTION INTRAVENOUS at 00:08

## 2022-01-01 RX ADMIN — PIPERACILLIN AND TAZOBACTAM 4.5 G: 4; .5 INJECTION, POWDER, FOR SOLUTION INTRAVENOUS at 08:24

## 2022-01-01 RX ADMIN — Medication 70 MCG/MIN: at 08:33

## 2022-01-01 RX ADMIN — EPINEPHRINE 1 MG: 0.1 INJECTION, SOLUTION INTRAVENOUS at 15:09

## 2022-01-01 RX ADMIN — INSULIN HUMAN 10 UNITS: 100 INJECTION, SOLUTION PARENTERAL at 12:00

## 2022-01-01 RX ADMIN — Medication 12 MCG/MIN: at 19:23

## 2022-01-01 RX ADMIN — DEXTROSE MONOHYDRATE 500 ML: 100 INJECTION, SOLUTION INTRAVENOUS at 15:07

## 2022-01-01 RX ADMIN — SODIUM BICARBONATE 100 MEQ: 84 INJECTION INTRAVENOUS at 01:46

## 2022-01-01 RX ADMIN — INSULIN HUMAN 10 UNITS: 100 INJECTION, SOLUTION PARENTERAL at 15:02

## 2022-01-01 RX ADMIN — CHLORHEXIDINE GLUCONATE 0.12% ORAL RINSE 15 ML: 1.2 LIQUID ORAL at 11:30

## 2022-01-01 RX ADMIN — CALCIUM CHLORIDE: 100 INJECTION, SOLUTION INTRAVENOUS at 12:00

## 2022-01-01 RX ADMIN — DEXTROSE MONOHYDRATE 50 ML: 25 INJECTION, SOLUTION INTRAVENOUS at 15:00

## 2022-01-01 RX ADMIN — DEXMEDETOMIDINE HYDROCHLORIDE 1 MCG/KG/HR: 4 INJECTION, SOLUTION INTRAVENOUS at 09:30

## 2022-01-01 RX ADMIN — DEXTROSE MONOHYDRATE 250 ML: 100 INJECTION, SOLUTION INTRAVENOUS at 01:00

## 2022-01-01 RX ADMIN — AMIODARONE HYDROCHLORIDE 150 MG: 50 INJECTION, SOLUTION INTRAVENOUS at 11:20

## 2022-01-01 RX ADMIN — VASOPRESSIN 0.04 UNITS/MIN: 20 INJECTION PARENTERAL at 09:51

## 2022-01-01 RX ADMIN — Medication 100 MEQ: at 01:46

## 2022-01-01 RX ADMIN — Medication 80 MCG/MIN: at 06:55

## 2022-01-01 RX ADMIN — ROCURONIUM BROMIDE 10 MG: 10 INJECTION, SOLUTION INTRAVENOUS at 06:30

## 2022-01-01 RX ADMIN — SUCCINYLCHOLINE CHLORIDE 20 MG/ML INJECTION SOLUTION 80 MG: SOLUTION at 06:30

## 2022-01-01 RX ADMIN — Medication 300 MCG/MIN: at 13:36

## 2022-01-01 RX ADMIN — EPINEPHRINE 5 MCG/MIN: 1 INJECTION INTRAMUSCULAR; INTRAVENOUS; SUBCUTANEOUS at 12:45

## 2022-01-01 RX ADMIN — ETOMIDATE 10 MCG: 2 INJECTION INTRAVENOUS at 06:30

## 2022-01-01 RX ADMIN — Medication 2 UNITS: at 14:21

## 2022-01-01 RX ADMIN — EPINEPHRINE 1 MG: 0.1 INJECTION, SOLUTION INTRAVENOUS at 14:58

## 2022-01-01 RX ADMIN — FLUCONAZOLE 200 MG: 2 INJECTION, SOLUTION INTRAVENOUS at 14:18

## 2022-01-01 RX ADMIN — VANCOMYCIN HYDROCHLORIDE 2000 MG: 10 INJECTION, POWDER, LYOPHILIZED, FOR SOLUTION INTRAVENOUS at 13:23

## 2022-01-01 RX ADMIN — SODIUM BICARBONATE: 84 INJECTION, SOLUTION INTRAVENOUS at 05:37

## 2022-01-01 RX ADMIN — INSULIN LISPRO 10 UNITS: 100 INJECTION, SOLUTION INTRAVENOUS; SUBCUTANEOUS at 08:24

## 2022-01-01 RX ADMIN — PROPOFOL 20 MCG/KG/MIN: 10 INJECTION, EMULSION INTRAVENOUS at 08:18

## 2022-01-01 RX ADMIN — CALCIUM CHLORIDE 1 G: 100 INJECTION, SOLUTION INTRAVENOUS at 11:18

## 2022-01-01 RX ADMIN — Medication 30 MCG/MIN: at 08:28

## 2022-01-01 RX ADMIN — CEFTRIAXONE SODIUM 1 G: 1 INJECTION, POWDER, FOR SOLUTION INTRAMUSCULAR; INTRAVENOUS at 14:04

## 2022-01-01 RX ADMIN — DEXTROSE MONOHYDRATE 250 ML: 100 INJECTION, SOLUTION INTRAVENOUS at 11:35

## 2022-01-01 RX ADMIN — MEROPENEM 1 G: 1 INJECTION, POWDER, FOR SOLUTION INTRAVENOUS at 05:28

## 2022-01-01 RX ADMIN — Medication 8 MCG/MIN: at 16:24

## 2022-01-01 RX ADMIN — LIDOCAINE HYDROCHLORIDE: 20 INJECTION, SOLUTION INTRAVENOUS at 07:00

## 2022-01-01 RX ADMIN — Medication 5 MCG/MIN: at 12:16

## 2022-01-01 RX ADMIN — SODIUM BICARBONATE 50 MEQ: 84 INJECTION INTRAVENOUS at 11:13

## 2022-01-01 RX ADMIN — HYDROMORPHONE HYDROCHLORIDE 0.5 MG: 1 INJECTION, SOLUTION INTRAMUSCULAR; INTRAVENOUS; SUBCUTANEOUS at 22:38

## 2022-01-01 RX ADMIN — SODIUM BICARBONATE: 84 INJECTION, SOLUTION INTRAVENOUS at 02:09

## 2022-01-01 RX ADMIN — DEXTROSE MONOHYDRATE 10 MCG/MIN: 50 INJECTION, SOLUTION INTRAVENOUS at 12:10

## 2022-01-01 RX ADMIN — Medication 5 MG/HR: at 13:00

## 2022-01-01 RX ADMIN — EPINEPHRINE 1 MG: 0.1 INJECTION, SOLUTION INTRAVENOUS at 11:14

## 2022-01-01 RX ADMIN — Medication 34 MCG/MIN: at 03:36

## 2022-01-01 RX ADMIN — SODIUM CHLORIDE 1000 ML: 9 INJECTION, SOLUTION INTRAVENOUS at 08:24

## 2022-01-01 RX ADMIN — HYDROMORPHONE HYDROCHLORIDE 0.5 MG: 1 INJECTION, SOLUTION INTRAMUSCULAR; INTRAVENOUS; SUBCUTANEOUS at 17:43

## 2022-01-01 RX ADMIN — EPINEPHRINE 1 MG: 0.1 INJECTION, SOLUTION INTRAVENOUS at 14:54

## 2022-01-01 RX ADMIN — MIDAZOLAM HYDROCHLORIDE 4 MG: 1 INJECTION, SOLUTION INTRAMUSCULAR; INTRAVENOUS at 10:49

## 2022-01-01 RX ADMIN — SODIUM BICARBONATE: 84 INJECTION INTRAVENOUS at 09:00

## 2022-01-01 RX ADMIN — INSULIN LISPRO 6 UNITS: 100 INJECTION, SOLUTION INTRAVENOUS; SUBCUTANEOUS at 10:34

## 2022-01-01 RX ADMIN — Medication 36 MCG/MIN: at 03:50

## 2022-01-01 RX ADMIN — SODIUM CHLORIDE, POTASSIUM CHLORIDE, SODIUM LACTATE AND CALCIUM CHLORIDE 125 ML/HR: 600; 310; 30; 20 INJECTION, SOLUTION INTRAVENOUS at 23:55

## 2022-01-01 RX ADMIN — ALBUMIN (HUMAN) 25 G: 0.25 INJECTION, SOLUTION INTRAVENOUS at 13:12

## 2022-01-01 RX ADMIN — Medication 24 MCG/MIN: at 23:18

## 2022-01-01 RX ADMIN — SODIUM CHLORIDE, POTASSIUM CHLORIDE, SODIUM LACTATE AND CALCIUM CHLORIDE 100 ML/HR: 600; 310; 30; 20 INJECTION, SOLUTION INTRAVENOUS at 02:14

## 2022-01-01 RX ADMIN — SODIUM CHLORIDE 1000 ML: 9 INJECTION, SOLUTION INTRAVENOUS at 07:01

## 2022-01-01 RX ADMIN — SODIUM BICARBONATE 50 MEQ: 84 INJECTION INTRAVENOUS at 08:43

## 2022-01-01 RX ADMIN — EPINEPHRINE 1 MG: 0.1 INJECTION, SOLUTION INTRAVENOUS at 15:06

## 2022-01-01 RX ADMIN — EPINEPHRINE 1 MG: 0.1 INJECTION, SOLUTION INTRAVENOUS at 08:43

## 2022-01-01 RX ADMIN — SODIUM CHLORIDE 1000 ML: 9 INJECTION, SOLUTION INTRAVENOUS at 17:39

## 2022-01-01 RX ADMIN — Medication 32 MCG/MIN: at 03:22

## 2022-01-01 RX ADMIN — DIPHENHYDRAMINE HYDROCHLORIDE 12.5 MG: 50 INJECTION, SOLUTION INTRAMUSCULAR; INTRAVENOUS at 12:16

## 2022-01-01 RX ADMIN — CALCIUM CHLORIDE 1 G: 100 INJECTION, SOLUTION INTRAVENOUS at 12:00

## 2022-01-01 RX ADMIN — HYDROCORTISONE SODIUM SUCCINATE 50 MG: 100 INJECTION, POWDER, FOR SOLUTION INTRAMUSCULAR; INTRAVENOUS at 13:09

## 2022-01-01 RX ADMIN — EPINEPHRINE 1 MG: 0.1 INJECTION, SOLUTION INTRAVENOUS at 15:03

## 2022-01-01 RX ADMIN — EPINEPHRINE 1 MG: 0.1 INJECTION, SOLUTION INTRAVENOUS at 11:11

## 2022-01-01 RX ADMIN — SODIUM CHLORIDE 1000 ML: 9 INJECTION, SOLUTION INTRAVENOUS at 14:06

## 2022-01-01 RX ADMIN — HYDROMORPHONE HYDROCHLORIDE 0.5 MG: 1 INJECTION, SOLUTION INTRAMUSCULAR; INTRAVENOUS; SUBCUTANEOUS at 12:23

## 2022-01-01 RX ADMIN — DEXTROSE MONOHYDRATE 250 ML: 100 INJECTION, SOLUTION INTRAVENOUS at 04:40

## 2022-01-01 RX ADMIN — SODIUM BICARBONATE 100 MEQ: 84 INJECTION INTRAVENOUS at 05:01

## 2022-09-19 PROBLEM — K56.609 BOWEL OBSTRUCTION (HCC): Status: ACTIVE | Noted: 2022-01-01

## 2022-09-19 NOTE — Clinical Note
Status[de-identified] INPATIENT [101]   Type of Bed: Surgical [18]   Inpatient Hospitalization Certified Necessary for the Following Reasons: 3.  Patient receiving treatment that can only be provided in an inpatient setting (further clarification in H&P documentation)   Admitting Diagnosis: Bowel obstruction [facility]) [[id number]]   Admitting Physician: Vishnu Dodd [[id number]]   Attending Physician: Vishnu Dodd [[id number]]   Estimated Length of Stay: 2 Midnights   Discharge Plan[de-identified] Home with Office Follow-up

## 2022-09-20 NOTE — ED TRIAGE NOTES
Patient called EMS for abdominal pain, patient reports abdominal distention and back pain says that she has been taking a lot of imodium the last few days

## 2022-09-20 NOTE — PROGRESS NOTES
TRANSFER - IN REPORT:    Verbal report received from RODRIGUE Luciano(name) on France Krishnamurthy  being received from [facility](unit) for change in patient condition(hypotension, AMS)      Report consisted of patients Situation, Background, Assessment and   Recommendations(SBAR). Information from the following report(s) SBAR and Kardex was reviewed with the receiving nurse. Opportunity for questions and clarification was provided. Assessment completed upon patients arrival to unit and care assumed. Domingo Montiel

## 2022-09-20 NOTE — PROGRESS NOTES
4182: Chart reviewed. Per MD notes, patient NPO with q6h soap suds enemas, IVF and IV ABX (Zosyn). CM will complete DCP assessment once on unit. CM will continue to follow patient and recs of medical team.    1000:     Reason for Admission:  Bowel Obstruction                     RUR Score:          8%           Plan for utilizing home health:      Not at this time    PCP: First and Last name:  None     Name of Practice:    Are you a current patient: Yes/No:    Approximate date of last visit:    Can you participate in a virtual visit with your PCP:                     Current Advanced Directive/Advance Care Plan: Full Code      Healthcare Decision Maker:   Click here to complete [address] including selection of the Healthcare Decision Maker Relationship (ie \"Primary\")           Ian Guevara, Friend [phone]                  Transition of Care Plan:   CM met with patient at bedside for DCP. Patient reports living with \"someone\" in a one story home with 3-4 steps to the entrance. Prior to admission, patient relays being independent with ADLs. Patient states she has not driven in some time. No DME. No previous HH/IRF/SNF. Medications obtained from Jaquelin Mcadams. When medically stable, patient states Lincoln Agarwal will transport her home. CM will continue to follow patient and recs of medical team.    Current Dispo: Discharge Home Self-Care.

## 2022-09-20 NOTE — H&P
History and Physical    Subjective:     Vee Sharma is a 59 y.o.  female who presented to the emergency department with complaints of abdominal pain and distention. She has been experiencing cramping abdominal pain which is worsened over the past 3 days. Apparently she has been taking Imodium because she is staying at a friend's house as she is unable to use the bathroom for social reasons. As result she has not had a bowel movement in 3 days. She does state that with the pain she has nausea but denies any vomiting. She also states that she has worsening abdominal distention. In the emergency department she had blood work done which was remarkable for CO2 15, BUN 21, creatinine 1.82 (have no baseline). Alysis was significant for 3+ bacteria, 20-50 white blood cell count, small amount of blood. CT scan which demonstrated dilated fluid-filled proximal colon with moderate amount of stool in the sigmoid colon and rectum. Small volume ascites noted. Hepatic steatosis noted consistent with a diagnosis of Beltran. I personally viewed the images myself. She has a mass medical history significant for diabetes, Seun Ser with cirrhosis although she states she has not seen a hepatologist in quite some time, fibromyalgia. Her medications are trospium 60 mg daily before breakfast for overactive bladder, clonazepam 0.5 mg every afternoon as needed, fluoxetine 60 mg daily, metformin 1000 mg twice daily. Past Medical History:   Diagnosis Date    Diabetes ([facility] [address]     Fatty liver disease, nonalcoholic     Fibromyalgia       No past surgical history on file. No family history on file.    Social History     Tobacco Use    Smoking status: Every Day     Types: Cigarettes    Smokeless tobacco: Never   Substance Use Topics    Alcohol use: Not Currently       Prior to Admission medications    Not on File     Allergies   Allergen Reactions    Morphine Unknown (comments)        Review of Systems:  A comprehensive review of systems was negative except for that written in the History of Present Illness. Objective: Intake and Output:    No intake/output data recorded. No intake/output data recorded. Physical Exam:   Physical Exam  Constitutional:       General: She is in acute distress. Appearance: She is obese. She is not ill-appearing. HENT:      Head: Normocephalic and atraumatic. Cardiovascular:      Rate and Rhythm: Normal rate and regular rhythm. Pulmonary:      Effort: Pulmonary effort is normal.      Breath sounds: Normal breath sounds. Abdominal:      General: There is distension. Palpations: Abdomen is soft. There is no mass. Tenderness: There is abdominal tenderness. There is no guarding or rebound. Hernia: No hernia is present. Musculoskeletal:         General: Normal range of motion. Cervical back: Normal range of motion and neck supple. Skin:     General: Skin is warm and dry. Neurological:      General: No focal deficit present. Mental Status: She is alert and oriented to person, place, and time. Psychiatric:         Mood and Affect: Mood normal.         Thought Content: Thought content normal.         Judgment: Judgment normal.        Data Review:   Recent Results (from the past 24 hour(s))   CBC WITH AUTOMATED DIFF    Collection Time: 09/19/22  9:12 PM   Result Value Ref Range    WBC 10.2 3.6 - 11.0 K/uL    RBC 6.17 (H) 3.80 - 5.20 M/uL    HGB 19.0 (H) 11.5 - 16.0 g/dL    HCT 55.7 (H) 35.0 - 47.0 %    MCV 90.3 80.0 - 99.0 FL    MCH 30.8 26.0 - 34.0 PG    MCHC 34.1 30.0 - 36.5 g/dL    RDW 13.8 11.5 - 14.5 %    PLATELET 045 461 - 981 K/uL    MPV 10.0 8.9 - 12.9 FL    NRBC 0.0 0.0  WBC    ABSOLUTE NRBC 0.00 0.00 - 0.01 K/uL    NEUTROPHILS 79 (H) 32 - 75 %    LYMPHOCYTES 14 12 - 49 %    MONOCYTES 6 5 - 13 %    EOSINOPHILS 0 0 - 7 %    BASOPHILS 1 0 - 1 %    IMMATURE GRANULOCYTES 0 0 - 0.5 %    ABS. NEUTROPHILS 8.0 1.8 - 8.0 K/UL    ABS. LYMPHOCYTES 1.5 0.8 - 3.5 K/UL    ABS. MONOCYTES 0.6 0.0 - 1.0 K/UL    ABS. EOSINOPHILS 0.0 0.0 - 0.4 K/UL    ABS. BASOPHILS 0.1 0.0 - 0.1 K/UL    ABS. IMM. GRANS. 0.0 0.00 - 0.04 K/UL    DF AUTOMATED     METABOLIC PANEL, COMPREHENSIVE    Collection Time: 09/19/22  9:12 PM   Result Value Ref Range    Sodium 133 (L) 136 - 145 mmol/L    Potassium 3.9 3.5 - 5.1 mmol/L    Chloride 102 97 - 108 mmol/L    CO2 15 (LL) 21 - 32 mmol/L    Anion gap 16 (H) 5 - 15 mmol/L    Glucose 397 (H) 65 - 100 mg/dL    BUN 21 (H) 6 - 20 mg/dL    Creatinine 1.82 (H) 0.55 - 1.02 mg/dL    BUN/Creatinine ratio 12 12 - 20      GFR est AA 35 (L) >60 ml/min/1.73m2    GFR est non-AA 29 (L) >60 ml/min/1.73m2    Calcium 11.0 (H) 8.5 - 10.1 mg/dL    Bilirubin, total 0.9 0.2 - 1.0 mg/dL    AST (SGOT) 41 (H) 15 - 37 U/L    ALT (SGPT) 62 12 - 78 U/L    Alk.  phosphatase 93 45 - 117 U/L    Protein, total 6.9 6.4 - 8.2 g/dL    Albumin 3.5 3.5 - 5.0 g/dL    Globulin 3.4 2.0 - 4.0 g/dL    A-G Ratio 1.0 (L) 1.1 - 2.2     LIPASE    Collection Time: 09/19/22  9:12 PM   Result Value Ref Range    Lipase 23 (L) 73 - 393 U/L   ACETAMINOPHEN    Collection Time: 09/20/22  3:27 AM   Result Value Ref Range    Acetaminophen level <2 (L) 10 - 30 ug/mL   URINALYSIS W/MICROSCOPIC    Collection Time: 09/20/22  3:30 AM   Result Value Ref Range    Color Laura      Appearance Cloudy (A) Clear      Specific gravity 1.025 1.003 - 1.030      pH (UA) 5.0      Protein 100 (A) Negative mg/dL    Glucose 100 (A) Negative mg/dL    Ketone 15 (A) Negative mg/dL    Bilirubin Moderate (A) Negative      Blood Small (A) Negative      Urobilinogen 4.0 (H) 0.2 - 1.0 EU/dL    Nitrites Negative Negative      Leukocyte Esterase Small (A) Negative      WBC 20-50 0 - 4 /hpf    RBC 5-10 0 - 5 /hpf    Bacteria 3+ (A) Negative /hpf    PRESUMPTIVE YEAST Present         Assessment:     Active Problems:    Bowel obstruction (HCC) (9/19/2022)        Plan:   Had extensive discussion with the patient and told her that her CT is consistent with large volume liquid and solid stool within the colon which is responsible for abdominal distention cramping pain. She has been taking Imodium around-the-clock to prevent bowel movements and has not had a bowel movement in now this is the fourth day. I told her we will start subset enemas every 6 hours. She is to remain n.p.o. with IV fluids. Repeat labs. I am not sure of her baseline creatinine was elevated 1.82. She is also acidotic with a bicarb of 15. We will start antibiotics for presumptive urinary tract infection. It was also noted there was a presumptive yeast present. We will also start an antifungal.  We will send urine for urine culture.

## 2022-09-20 NOTE — ED NOTES
NG tube attempted twice with size 16 and sizr 14 unsuccessfully, pt stated \" this is too painful and I cant do it. \" Dr. Wendy Resendiz called and notified

## 2022-09-20 NOTE — PROGRESS NOTES
Pt arrived at bedside. No NGT present, pt previously refusing placement. Educated patient on need for NGT, pt eventually agreed. Right nare NGT placed with 400ml of coffee ground stomach content drained. Pt hypotensive with SBP in 70s. Second IV access placed, Levo started. Pt c/o need to urinate, bladder scanner indicated 47ml of urine in bladder. Pain medication administered, Dr. Chapincito Brooks updated on patient condition. Will continue to monitor.

## 2022-09-20 NOTE — ED NOTES
TRANSFER - OUT REPORT:    Verbal report given to Nurse Prudencio Cohn (name) on Orlando Cline  being transferred to  [address] [place] [address] (unit) for routine progression of care       Report consisted of patients Situation, Background, Assessment and   Recommendations(SBAR). Information from the following report(s) SBAR, Kardex, MAR, and Recent Results was reviewed with the receiving nurse. Lines:   Peripheral IV 09/19/22 Right Antecubital (Active)   Site Assessment Clean, dry, & intact 09/19/22 2115   Phlebitis Assessment 0 09/19/22 2115   Infiltration Assessment 0 09/19/22 2115   Dressing Status Clean, dry, & intact 09/19/22 2115   Dressing Type Tape;Transparent 09/19/22 2115   Hub Color/Line Status Pink;Flushed;Patent 09/19/22 2115   Action Taken Blood drawn 09/19/22 2115   Alcohol Cap Used Yes 09/19/22 2115        Opportunity for questions and clarification was provided.       Patient transported with:   [employer]

## 2022-09-20 NOTE — PROGRESS NOTES
Dr. Sebastian Perkins at bedside. Updated on patient condition and stated concerns about pt's increased AMS, abdominal tenderness and worsening pallor. Received new orders for 1L bolus and requested soap enema be administered. Will continue to monitor.

## 2022-09-20 NOTE — CONSULTS
Progress Note    Patient: Nany Cates MRN: [id number]  SSN: [id number]    YOB: 1965  Age: 59 y.o. Sex: female      Admit Date: 9/19/2022    LOS: 1 day     Subjective:       57F, H/o fibromyalgia, fatty liver disease presented with abdominal pain abdominal pain and constipation with use of imodiium, CT scan showed colonic obstruction    HSOPIAYL COURSE:  LA 7.7, with MICHELA. Started with IVF, meropenem after being alergic to zosyn and started with fluconazole. UA suggestive of infection, might be a contaminent. UDS pending    Review of Systems   Constitutional:  Positive for activity change and appetite change. Negative for fatigue, fever and unexpected weight change. HENT:  Negative for congestion, facial swelling, postnasal drip, sinus pressure and trouble swallowing. Eyes:  Negative for discharge, redness and visual disturbance. Respiratory:  Negative for cough, chest tightness, shortness of breath and wheezing. Cardiovascular:  Positive for chest pain and palpitations. Negative for leg swelling. Gastrointestinal:  Positive for abdominal distention, abdominal pain, constipation, nausea and vomiting. Negative for diarrhea. Endocrine: Negative for cold intolerance, heat intolerance and polyuria. Genitourinary:  Negative for difficulty urinating and hematuria. Musculoskeletal:  Negative for arthralgias, back pain, gait problem, joint swelling and myalgias. Skin:  Negative for color change and pallor. Allergic/Immunologic: Negative for environmental allergies and food allergies. Neurological:  Positive for weakness. Negative for dizziness, tremors, seizures, facial asymmetry and headaches. Hematological:  Negative for adenopathy. Does not bruise/bleed easily.    Psychiatric/Behavioral:  Positive for confusion      Objective:     Vitals:    09/20/22 1230 09/20/22 1300 09/20/22 1400 09/20/22 1500   BP: 97/70 (!) 87/61 102/62    Pulse: (!) 116 (!) 120 (!) 120    Resp: (!) 34 (!) 37 28    Temp: 98.7 °F (37.1 °C)   97.7 °F (36.5 °C)   SpO2: 97% 95% 96%    Weight:       Height:            Intake and Output:  Current Shift: 09/20 0701 - 09/20 1900  In: -   Out: 400   Last three shifts: No intake/output data recorded. Physical Exam  Vitals and nursing note reviewed. Constitutional:       Appearance: She is obese. She is ill-appearing. HENT:      Head: Normocephalic and atraumatic. Nose: Nose normal.      Mouth/Throat:      Mouth: Mucous membranes are moist.      Pharynx: Oropharynx is clear. Eyes:      Conjunctiva/sclera: Conjunctivae normal.   Cardiovascular:      Rate and Rhythm: Regular rhythm. Tachycardia present. Pulses: Normal pulses. Heart sounds: Normal heart sounds. Pulmonary:      Effort: Pulmonary effort is normal.      Breath sounds: Normal breath sounds. Abdominal:      General: There is distension. Musculoskeletal:      Right lower leg: No edema. Left lower leg: No edema. Skin:     General: Skin is warm and dry. Coloration: Skin is not pale. Findings: No erythema. Neurological:      General: No focal deficit present. Mental Status: She is alert and oriented to person, place, and time. NG tube in place with coffee-ground aspirate       Lab/Data Review:  Recent Results (from the past 24 hour(s))   CBC WITH AUTOMATED DIFF    Collection Time: 09/19/22  9:12 PM   Result Value Ref Range    WBC 10.2 3.6 - 11.0 K/uL    RBC 6.17 (H) 3.80 - 5.20 M/uL    HGB 19.0 (H) 11.5 - 16.0 g/dL    HCT 55.7 (H) 35.0 - 47.0 %    MCV 90.3 80.0 - 99.0 FL    MCH 30.8 26.0 - 34.0 PG    MCHC 34.1 30.0 - 36.5 g/dL    RDW 13.8 11.5 - 14.5 %    PLATELET 256 641 - 079 K/uL    MPV 10.0 8.9 - 12.9 FL    NRBC 0.0 0.0  WBC    ABSOLUTE NRBC 0.00 0.00 - 0.01 K/uL    NEUTROPHILS 79 (H) 32 - 75 %    LYMPHOCYTES 14 12 - 49 %    MONOCYTES 6 5 - 13 %    EOSINOPHILS 0 0 - 7 %    BASOPHILS 1 0 - 1 %    IMMATURE GRANULOCYTES 0 0 - 0.5 %    ABS.  NEUTROPHILS 8.0 1.8 - 8.0 K/UL    ABS. LYMPHOCYTES 1.5 0.8 - 3.5 K/UL    ABS. MONOCYTES 0.6 0.0 - 1.0 K/UL    ABS. EOSINOPHILS 0.0 0.0 - 0.4 K/UL    ABS. BASOPHILS 0.1 0.0 - 0.1 K/UL    ABS. IMM. GRANS. 0.0 0.00 - 0.04 K/UL    DF AUTOMATED     METABOLIC PANEL, COMPREHENSIVE    Collection Time: 09/19/22  9:12 PM   Result Value Ref Range    Sodium 133 (L) 136 - 145 mmol/L    Potassium 3.9 3.5 - 5.1 mmol/L    Chloride 102 97 - 108 mmol/L    CO2 15 (LL) 21 - 32 mmol/L    Anion gap 16 (H) 5 - 15 mmol/L    Glucose 397 (H) 65 - 100 mg/dL    BUN 21 (H) 6 - 20 mg/dL    Creatinine 1.82 (H) 0.55 - 1.02 mg/dL    BUN/Creatinine ratio 12 12 - 20      GFR est AA 35 (L) >60 ml/min/1.73m2    GFR est non-AA 29 (L) >60 ml/min/1.73m2    Calcium 11.0 (H) 8.5 - 10.1 mg/dL    Bilirubin, total 0.9 0.2 - 1.0 mg/dL    AST (SGOT) 41 (H) 15 - 37 U/L    ALT (SGPT) 62 12 - 78 U/L    Alk.  phosphatase 93 45 - 117 U/L    Protein, total 6.9 6.4 - 8.2 g/dL    Albumin 3.5 3.5 - 5.0 g/dL    Globulin 3.4 2.0 - 4.0 g/dL    A-G Ratio 1.0 (L) 1.1 - 2.2     LIPASE    Collection Time: 09/19/22  9:12 PM   Result Value Ref Range    Lipase 23 (L) 73 - 393 U/L   ACETAMINOPHEN    Collection Time: 09/20/22  3:27 AM   Result Value Ref Range    Acetaminophen level <2 (L) 10 - 30 ug/mL   URINALYSIS W/MICROSCOPIC    Collection Time: 09/20/22  3:30 AM   Result Value Ref Range    Color Laura      Appearance Cloudy (A) Clear      Specific gravity 1.025 1.003 - 1.030      pH (UA) 5.0      Protein 100 (A) Negative mg/dL    Glucose 100 (A) Negative mg/dL    Ketone 15 (A) Negative mg/dL    Bilirubin Moderate (A) Negative      Blood Small (A) Negative      Urobilinogen 4.0 (H) 0.2 - 1.0 EU/dL    Nitrites Negative Negative      Leukocyte Esterase Small (A) Negative      WBC 20-50 0 - 4 /hpf    RBC 5-10 0 - 5 /hpf    Bacteria 3+ (A) Negative /hpf    PRESUMPTIVE YEAST Present     CBC WITH AUTOMATED DIFF    Collection Time: 09/20/22  7:58 AM   Result Value Ref Range    WBC 7.1 3.6 - 11.0 K/uL RBC 5.98 (H) 3.80 - 5.20 M/uL    HGB 18.2 (H) 11.5 - 16.0 g/dL    HCT 53.5 (H) 35.0 - 47.0 %    MCV 89.5 80.0 - 99.0 FL    MCH 30.4 26.0 - 34.0 PG    MCHC 34.0 30.0 - 36.5 g/dL    RDW 13.8 11.5 - 14.5 %    PLATELET 690 (L) 151 - 400 K/uL    MPV 10.9 8.9 - 12.9 FL    NRBC 0.0 0.0  WBC    ABSOLUTE NRBC 0.00 0.00 - 0.01 K/uL    NEUTROPHILS 45 32 - 75 %    BAND NEUTROPHILS 10 (H) 0 - 6 %    LYMPHOCYTES 19 12 - 49 %    MONOCYTES 3 (L) 5 - 13 %    EOSINOPHILS 0 0 - 7 %    BASOPHILS 0 0 - 1 %    METAMYELOCYTES 20 (H) 0 %    MYELOCYTES 3 (H) 0 %    IMMATURE GRANULOCYTES 0 %    ABS. NEUTROPHILS 3.9 1.8 - 8.0 K/UL    ABS. LYMPHOCYTES 1.3 0.8 - 3.5 K/UL    ABS. MONOCYTES 0.2 0.0 - 1.0 K/UL    ABS. EOSINOPHILS 0.0 0.0 - 0.4 K/UL    ABS. BASOPHILS 0.0 0.0 - 0.1 K/UL    ABS. IMM. GRANS. 0.0 K/UL    DF Manual      RBC COMMENTS Alem cells  1+       METABOLIC PANEL, COMPREHENSIVE    Collection Time: 09/20/22  7:58 AM   Result Value Ref Range    Sodium 136 136 - 145 mmol/L    Potassium 4.6 3.5 - 5.1 mmol/L    Chloride 103 97 - 108 mmol/L    CO2 14 (LL) 21 - 32 mmol/L    Anion gap 19 (H) 5 - 15 mmol/L    Glucose 315 (H) 65 - 100 mg/dL    BUN 34 (H) 6 - 20 mg/dL    Creatinine 2.75 (H) 0.55 - 1.02 mg/dL    BUN/Creatinine ratio 12 12 - 20      GFR est AA 21 (L) >60 ml/min/1.73m2    GFR est non-AA 18 (L) >60 ml/min/1.73m2    Calcium 9.8 8.5 - 10.1 mg/dL    Bilirubin, total 1.0 0.2 - 1.0 mg/dL    AST (SGOT) 96 (H) 15 - 37 U/L    ALT (SGPT) 71 12 - 78 U/L    Alk.  phosphatase 130 (H) 45 - 117 U/L    Protein, total 5.6 (L) 6.4 - 8.2 g/dL    Albumin 3.0 (L) 3.5 - 5.0 g/dL    Globulin 2.6 2.0 - 4.0 g/dL    A-G Ratio 1.2 1.1 - 2.2     GLUCOSE, POC    Collection Time: 09/20/22  8:07 AM   Result Value Ref Range    Glucose (POC) 392 (H) 65 - 100 mg/dL    Performed by [address] [place] [address],[address], POC    Collection Time: 09/20/22 10:10 AM   Result Value Ref Range    Glucose (POC) 266 (H) 65 - 100 mg/dL    Performed by LALIT GREGORIO    HGB & HCT Collection Time: 09/20/22 12:00 PM   Result Value Ref Range    HGB 16.8 (H) 11.5 - 16.0 g/dL    HCT 50.1 (H) 35.0 - 47.0 %   AMMONIA    Collection Time: 09/20/22 12:30 PM   Result Value Ref Range    Ammonia, plasma 56 (H) <01 umol/L   METABOLIC PANEL, BASIC    Collection Time: 09/20/22 12:30 PM   Result Value Ref Range    Sodium 135 (L) 136 - 145 mmol/L    Potassium 5.2 (H) 3.5 - 5.1 mmol/L    Chloride 103 97 - 108 mmol/L    CO2 18 (L) 21 - 32 mmol/L    Anion gap 14 5 - 15 mmol/L    Glucose 227 (H) 65 - 100 mg/dL    BUN 40 (H) 6 - 20 mg/dL    Creatinine 2.87 (H) 0.55 - 1.02 mg/dL    BUN/Creatinine ratio 14 12 - 20      GFR est AA 20 (L) >60 ml/min/1.73m2    GFR est non-AA 17 (L) >60 ml/min/1.73m2    Calcium 8.7 8.5 - 10.1 mg/dL   LACTIC ACID    Collection Time: 09/20/22 12:30 PM   Result Value Ref Range    Lactic acid 7.7 (HH) 0.4 - 2.0 mmol/L   GLUCOSE, POC    Collection Time: 09/20/22  2:17 PM   Result Value Ref Range    Glucose (POC) 174 (H) 65 - 100 mg/dL    Performed by [facility]          Assessment and plan:      (1) Acute encephaloapthy     (2) Sepsis     ()3 UTI    (4) colonic obstruction    (5) MICHELA    (6) non-anion gap etabolic acidosis     (7) fatty liver    PLAN:   IVF  Meropenem and antifungal  BMP in AM        Signed By: Nate Gutierres MD     September 20, 2022

## 2022-09-20 NOTE — PROGRESS NOTES
Dr. Noreen Neal at bedside, Updated on patient condition including increased tenderness to abdomen, increased confusion and worsening pallor. Received new orders for abd CT. Will continue to monitor.

## 2022-09-20 NOTE — ED PROVIDER NOTES
EMERGENCY DEPARTMENT HISTORY AND PHYSICAL EXAM      Date: 9/19/2022  Patient Name: Latanya Tamayo    History of Presenting Illness     Chief Complaint   Patient presents with    Abdominal Pain       History Provided By: Patient    HPI: Latanya Tamayo, 60 y.o. female with history of diabetes, nonalcoholic fatty liver disease and fibromyalgia presents for evaluation of abdominal pain symptoms have been present and worsening over the last 3 days. Patient states that she takes Imodium regularly and has been taking it recently due to being at a friend's house where she is unable to use the bathroom for social reasons. Last dose of Imodium was earlier today. She has not had a bowel movement in 3 days. She endorses nausea without vomiting and generalized abdominal pain and distention. Associated fevers, no respiratory or chest symptoms. No history of intra-abdominal surgeries, no history of ascites    There are no other complaints, changes, or physical findings at this time. PCP: None    No current facility-administered medications on file prior to encounter. No current outpatient medications on file prior to encounter. Past History     Past Medical History:  Past Medical History:   Diagnosis Date    Diabetes ([facility] [address]     Fatty liver disease, nonalcoholic     Fibromyalgia        Past Surgical History:  No past surgical history on file. Family History:  No family history on file. Social History:  Social History     Tobacco Use    Smoking status: Every Day     Types: Cigarettes    Smokeless tobacco: Never   Substance Use Topics    Alcohol use: Not Currently    Drug use: Not Currently       Allergies: Allergies   Allergen Reactions    Morphine Unknown (comments)       Review of Systems   Review of Systems  Constitutional: Negative except as in HPI. Eyes: Negative except as in HPI.  ENT: Negative except as in HPI. Cardiovascular: Negative except as in HPI.   Respiratory: Negative except as in HPI.  Gastrointestinal: Negative except as in HPI. Genitourinary: Negative except as in HPI. Musculoskeletal: Negative except as in HPI. Integumentary: Negative except as in HPI. Neurological: Negative except as in HPI. Psychiatric: Negative except as in HPI. Endocrine: Negative except as in HPI. Hematologic/Lymphatic: Negative except as in HPI. Allergic/Immunologic: Negative except as in HPI. Physical Exam   Physical Exam  Constitutional:       Appearance: Normal appearance. HENT:      Head: Normocephalic and atraumatic. Nose: Nose normal.      Mouth/Throat:      Mouth: Mucous membranes are moist.   Eyes:      Conjunctiva/sclera: Conjunctivae normal.      Pupils: Pupils are equal, round, and reactive to light. Cardiovascular:      Rate and Rhythm: Normal rate and regular rhythm. Heart sounds: Normal heart sounds. Pulmonary:      Effort: Pulmonary effort is normal.      Breath sounds: Normal breath sounds. Abdominal:      General: There is no distension. Palpations: Abdomen is soft. Tenderness: There is generalized abdominal tenderness. There is no guarding or rebound. Musculoskeletal:         General: No tenderness or deformity. Normal range of motion. Cervical back: Normal range of motion and neck supple. Skin:     General: Skin is warm and dry. Neurological:      General: No focal deficit present. Mental Status: She is alert and oriented to person, place, and time.    Psychiatric:         Mood and Affect: Mood normal.         Behavior: Behavior normal.        Lab and Diagnostic Study Results   Labs -     Recent Results (from the past 12 hour(s))   CBC WITH AUTOMATED DIFF    Collection Time: 09/19/22  9:12 PM   Result Value Ref Range    WBC 10.2 3.6 - 11.0 K/uL    RBC 6.17 (H) 3.80 - 5.20 M/uL    HGB 19.0 (H) 11.5 - 16.0 g/dL    HCT 55.7 (H) 35.0 - 47.0 %    MCV 90.3 80.0 - 99.0 FL    MCH 30.8 26.0 - 34.0 PG    MCHC 34.1 30.0 - 36.5 g/dL    RDW 13.8 11.5 - 14.5 % PLATELET 907 628 - 665 K/uL    MPV 10.0 8.9 - 12.9 FL    NRBC 0.0 0.0  WBC    ABSOLUTE NRBC 0.00 0.00 - 0.01 K/uL    NEUTROPHILS 79 (H) 32 - 75 %    LYMPHOCYTES 14 12 - 49 %    MONOCYTES 6 5 - 13 %    EOSINOPHILS 0 0 - 7 %    BASOPHILS 1 0 - 1 %    IMMATURE GRANULOCYTES 0 0 - 0.5 %    ABS. NEUTROPHILS 8.0 1.8 - 8.0 K/UL    ABS. LYMPHOCYTES 1.5 0.8 - 3.5 K/UL    ABS. MONOCYTES 0.6 0.0 - 1.0 K/UL    ABS. EOSINOPHILS 0.0 0.0 - 0.4 K/UL    ABS. BASOPHILS 0.1 0.0 - 0.1 K/UL    ABS. IMM. GRANS. 0.0 0.00 - 0.04 K/UL    DF AUTOMATED     METABOLIC PANEL, COMPREHENSIVE    Collection Time: 09/19/22  9:12 PM   Result Value Ref Range    Sodium 133 (L) 136 - 145 mmol/L    Potassium 3.9 3.5 - 5.1 mmol/L    Chloride 102 97 - 108 mmol/L    CO2 15 (LL) 21 - 32 mmol/L    Anion gap 16 (H) 5 - 15 mmol/L    Glucose 397 (H) 65 - 100 mg/dL    BUN 21 (H) 6 - 20 mg/dL    Creatinine 1.82 (H) 0.55 - 1.02 mg/dL    BUN/Creatinine ratio 12 12 - 20      GFR est AA 35 (L) >60 ml/min/1.73m2    GFR est non-AA 29 (L) >60 ml/min/1.73m2    Calcium 11.0 (H) 8.5 - 10.1 mg/dL    Bilirubin, total 0.9 0.2 - 1.0 mg/dL    AST (SGOT) 41 (H) 15 - 37 U/L    ALT (SGPT) 62 12 - 78 U/L    Alk. phosphatase 93 45 - 117 U/L    Protein, total 6.9 6.4 - 8.2 g/dL    Albumin 3.5 3.5 - 5.0 g/dL    Globulin 3.4 2.0 - 4.0 g/dL    A-G Ratio 1.0 (L) 1.1 - 2.2     LIPASE    Collection Time: 09/19/22  9:12 PM   Result Value Ref Range    Lipase 23 (L) 73 - 393 U/L       Radiologic Studies -   @lastxrresult@  CT Results  (Last 48 hours)                 09/19/22 2222  CT ABD PELV WO CONT Final result    Impression:  Dilated fluid-filled proximal colon; partial colonic obstruction is not   excluded. Moderate amount of high attenuation stool in the sigmoid colon and   rectum. Small ascites. Cholecystolithiasis. Hepatic steatosis. Narrative:  EXAM: CT ABD PELV WO CONT       INDICATION: Abdominal pain and distention        COMPARISON: None       IV CONTRAST: None. ORAL CONTRAST: None       TECHNIQUE:    Thin axial images were obtained through the abdomen and pelvis. Coronal and   sagittal reformats were generated. CT dose reduction was achieved through use of   a standardized protocol tailored for this examination and automatic exposure   control for dose modulation. The absence of intravenous contrast material reduces the sensitivity for   evaluation of the vasculature and solid organs. FINDINGS:    LOWER THORAX: No significant abnormality in the incidentally imaged lower chest.   LIVER: Enlarged. Mild diffuse steatosis. BILIARY TREE: Gallbladder is mildly distended. There is a 6 mm calculus in the   gallbladder neck. No gallbladder wall thickening is evident. CBD is not dilated. SPLEEN: within normal limits. PANCREAS: No focal abnormality. ADRENALS: Unremarkable. KIDNEYS/URETERS: No calculus or hydronephrosis. STOMACH: Unremarkable. SMALL BOWEL: No dilatation or wall thickening. COLON: Diffusely distended and fluid-filled. Moderate amount of dense stool in   the sigmoid colon and rectum. APPENDIX: Unremarkable. PERITONEUM: Small ascites. RETROPERITONEUM: No lymphadenopathy or aortic aneurysm. REPRODUCTIVE ORGANS: Unremarkable. URINARY BLADDER: Decompressed. BONES: No destructive bone lesion. ABDOMINAL WALL: No mass or hernia. ADDITIONAL COMMENTS: N/A                 CXR Results  (Last 48 hours)      None            Medical Decision Making and ED Course   Differential Diagnosis & Medical Decision Making Provider Note:   77-year-old female presenting for evaluation of abdominal pain. Given history of liver disease and abdominal distention, concern for possible ascites. Like infrasternal for constipation versus bowel obstruction based on history and physical exam.  Differential also includes urinary tract infection and will check for this with UA. CT scan to evaluate for intra-abdominal pathology.       - I am the first provider for this patient. I reviewed the vital signs, available nursing notes, past medical history, past surgical history, family history and social history. The patients presenting problems have been discussed, and they are in agreement with the care plan formulated and outlined with them. I have encouraged them to ask questions as they arise throughout their visit. Vital Signs-Reviewed the patient's vital signs. Patient Vitals for the past 12 hrs:   Temp Pulse Resp BP SpO2   09/19/22 2114 98.1 °F (36.7 °C) -- -- -- --   09/19/22 2108 -- 78 17 (!) 136/90 95 %       ED Course:          Procedures   Performed by: Dottie Wallace MD  Procedures      Disposition   Disposition: Admitted to Floor Surgical Floor the case was discussed with the admitting physician Thien    Diagnosis/Clinical Impression     Clinical Impression:   1. Ileus of unspecified type [facility])        Attestations: Naveen Strickland MD, am the primary clinician of record. Please note that this dictation was completed with [employer], the computer voice recognition software. Quite often unanticipated grammatical, syntax, homophones, and other interpretive errors are inadvertently transcribed by the computer software. Please disregard these errors. Please excuse any errors that have escaped final proofreading. Thank you.

## 2022-09-20 NOTE — CONSULTS
Consult Date: 9/20/2022    IP CONSULT TO NEPHROLOGY  Consult performed by: Beck Isabel MD  Consult ordered by: Ingrid Cole MD          Subjective   HISTORY OF PRESENTING ILLNESS :  Mary Lr is a 59 y.o.,female ,WHITE/NON- with past medical history of diabetes, fatty liver disease, fibromyalgia was admitted to hospital with abdominal pain and severe constipation besides frequent use of Imodium. CT scan of abdomen pelvis was done which shows colonic obstruction. NG tube is placed with coffee-ground NG tube aspirate. She is being evaluated by surgical team.  Lethargic waking up. Clearly has abdominal distention and is uncomfortable. Not short of breath while at rest.  States she has been urinating well with 80 difficulties. Accompanied by her boyfriend, who is her by her bedside. Past Medical History:   Diagnosis Date    Diabetes ([address]     Fatty liver disease, nonalcoholic     Fibromyalgia       No past surgical history on file. No family history on file.    Social History     Tobacco Use    Smoking status: Every Day     Types: Cigarettes    Smokeless tobacco: Never   Substance Use Topics    Alcohol use: Not Currently       Current Facility-Administered Medications   Medication Dose Route Frequency Provider Last Rate Last Admin    HYDROmorphone (DILAUDID) syringe 0.5 mg  0.5 mg IntraVENous Q4H PRN Ingrid Cole MD   0.5 mg at 09/20/22 1223    clonazePAM (KlonoPIN) tablet 0.5 mg  0.5 mg Oral QHS PRN Ingrid Cole MD        glucose chewable tablet 16 g  4 Tablet Oral PRN Ingrid Cole MD        glucagon (GLUCAGEN) injection 1 mg  1 mg IntraMUSCular PRN Ingrid Cole MD        dextrose 10% infusion 0-250 mL  0-250 mL IntraVENous PRN Ingrid Cole MD        NOREPINephrine (LEVOPHED) 8 mg in 0.9% NS 250ml infusion  0.5-30 mcg/min IntraVENous TITRATE Barry Tang MD 7.5 mL/hr at 09/20/22 1230 4 mcg/min at 09/20/22 1230    diphenhydrAMINE (BENADRYL) injection 12.5 mg 12.5 mg IntraVENous Q6H PRN Lisset Milligan MD   12.5 mg at 09/20/22 1216    cefTRIAXone (ROCEPHIN) 1 g in sterile water (preservative free) 10 mL IV syringe  1 g IntraVENous Q24H Lisset Milligan MD   1 g at 09/20/22 1404    0.9% sodium chloride infusion  75 mL/hr IntraVENous CONTINUOUS Lisset Milligan MD 75 mL/hr at 09/20/22 1406 75 mL/hr at 09/20/22 1406    insulin lispro (HUMALOG) injection   SubCUTAneous Q4H Lisset Milligan MD        Joseph Gardner ON 9/21/2022] pantoprazole (PROTONIX) 40 mg in 0.9% sodium chloride 10 mL injection  40 mg IntraVENous DAILY Lisset Milligan MD        fluconazole (DIFLUCAN) 200mg/100 mL IVPB (premix)  200 mg IntraVENous DAILY Lisset Milligan MD        lactated Ringers infusion  125 mL/hr IntraVENous CONTINUOUS Jack MD Tai 125 mL/hr at 09/19/22 2355 125 mL/hr at 09/19/22 2355        Review of Systems   Constitutional:  Positive for activity change and appetite change. Negative for fatigue, fever and unexpected weight change. HENT:  Negative for congestion, facial swelling, postnasal drip, sinus pressure and trouble swallowing. Eyes:  Negative for discharge, redness and visual disturbance. Respiratory:  Negative for cough, chest tightness, shortness of breath and wheezing. Cardiovascular:  Positive for chest pain and palpitations. Negative for leg swelling. Gastrointestinal:  Positive for abdominal distention, abdominal pain, constipation, nausea and vomiting. Negative for diarrhea. Endocrine: Negative for cold intolerance, heat intolerance and polyuria. Genitourinary:  Negative for difficulty urinating and hematuria. Musculoskeletal:  Negative for arthralgias, back pain, gait problem, joint swelling and myalgias. Skin:  Negative for color change and pallor. Allergic/Immunologic: Negative for environmental allergies and food allergies. Neurological:  Positive for weakness.  Negative for dizziness, tremors, seizures, facial asymmetry and headaches. Hematological:  Negative for adenopathy. Does not bruise/bleed easily. Psychiatric/Behavioral:  Positive for confusion. Objective     Vital signs for last 24 hours:  Visit Vitals  BP 97/70 (BP 1 Location: Left upper arm, BP Patient Position: At rest)   Pulse (!) 116   Temp 98.7 °F (37.1 °C)   Resp (!) 34   Ht 5' 9\" (1.753 m)   Wt 98.9 kg (218 lb 0.6 oz)   SpO2 97%   BMI 32.20 kg/m²           Recent Results (from the past 24 hour(s))   CBC WITH AUTOMATED DIFF    Collection Time: 09/19/22  9:12 PM   Result Value Ref Range    WBC 10.2 3.6 - 11.0 K/uL    RBC 6.17 (H) 3.80 - 5.20 M/uL    HGB 19.0 (H) 11.5 - 16.0 g/dL    HCT 55.7 (H) 35.0 - 47.0 %    MCV 90.3 80.0 - 99.0 FL    MCH 30.8 26.0 - 34.0 PG    MCHC 34.1 30.0 - 36.5 g/dL    RDW 13.8 11.5 - 14.5 %    PLATELET 099 499 - 490 K/uL    MPV 10.0 8.9 - 12.9 FL    NRBC 0.0 0.0  WBC    ABSOLUTE NRBC 0.00 0.00 - 0.01 K/uL    NEUTROPHILS 79 (H) 32 - 75 %    LYMPHOCYTES 14 12 - 49 %    MONOCYTES 6 5 - 13 %    EOSINOPHILS 0 0 - 7 %    BASOPHILS 1 0 - 1 %    IMMATURE GRANULOCYTES 0 0 - 0.5 %    ABS. NEUTROPHILS 8.0 1.8 - 8.0 K/UL    ABS. LYMPHOCYTES 1.5 0.8 - 3.5 K/UL    ABS. MONOCYTES 0.6 0.0 - 1.0 K/UL    ABS. EOSINOPHILS 0.0 0.0 - 0.4 K/UL    ABS. BASOPHILS 0.1 0.0 - 0.1 K/UL    ABS. IMM.  GRANS. 0.0 0.00 - 0.04 K/UL    DF AUTOMATED     METABOLIC PANEL, COMPREHENSIVE    Collection Time: 09/19/22  9:12 PM   Result Value Ref Range    Sodium 133 (L) 136 - 145 mmol/L    Potassium 3.9 3.5 - 5.1 mmol/L    Chloride 102 97 - 108 mmol/L    CO2 15 (LL) 21 - 32 mmol/L    Anion gap 16 (H) 5 - 15 mmol/L    Glucose 397 (H) 65 - 100 mg/dL    BUN 21 (H) 6 - 20 mg/dL    Creatinine 1.82 (H) 0.55 - 1.02 mg/dL    BUN/Creatinine ratio 12 12 - 20      GFR est AA 35 (L) >60 ml/min/1.73m2    GFR est non-AA 29 (L) >60 ml/min/1.73m2    Calcium 11.0 (H) 8.5 - 10.1 mg/dL    Bilirubin, total 0.9 0.2 - 1.0 mg/dL    AST (SGOT) 41 (H) 15 - 37 U/L    ALT (SGPT) 62 12 - 78 U/L Alk. phosphatase 93 45 - 117 U/L    Protein, total 6.9 6.4 - 8.2 g/dL    Albumin 3.5 3.5 - 5.0 g/dL    Globulin 3.4 2.0 - 4.0 g/dL    A-G Ratio 1.0 (L) 1.1 - 2.2     LIPASE    Collection Time: 09/19/22  9:12 PM   Result Value Ref Range    Lipase 23 (L) 73 - 393 U/L   ACETAMINOPHEN    Collection Time: 09/20/22  3:27 AM   Result Value Ref Range    Acetaminophen level <2 (L) 10 - 30 ug/mL   URINALYSIS W/MICROSCOPIC    Collection Time: 09/20/22  3:30 AM   Result Value Ref Range    Color Laura      Appearance Cloudy (A) Clear      Specific gravity 1.025 1.003 - 1.030      pH (UA) 5.0      Protein 100 (A) Negative mg/dL    Glucose 100 (A) Negative mg/dL    Ketone 15 (A) Negative mg/dL    Bilirubin Moderate (A) Negative      Blood Small (A) Negative      Urobilinogen 4.0 (H) 0.2 - 1.0 EU/dL    Nitrites Negative Negative      Leukocyte Esterase Small (A) Negative      WBC 20-50 0 - 4 /hpf    RBC 5-10 0 - 5 /hpf    Bacteria 3+ (A) Negative /hpf    PRESUMPTIVE YEAST Present     CBC WITH AUTOMATED DIFF    Collection Time: 09/20/22  7:58 AM   Result Value Ref Range    WBC 7.1 3.6 - 11.0 K/uL    RBC 5.98 (H) 3.80 - 5.20 M/uL    HGB 18.2 (H) 11.5 - 16.0 g/dL    HCT 53.5 (H) 35.0 - 47.0 %    MCV 89.5 80.0 - 99.0 FL    MCH 30.4 26.0 - 34.0 PG    MCHC 34.0 30.0 - 36.5 g/dL    RDW 13.8 11.5 - 14.5 %    PLATELET 942 (L) 283 - 400 K/uL    MPV 10.9 8.9 - 12.9 FL    NRBC 0.0 0.0  WBC    ABSOLUTE NRBC 0.00 0.00 - 0.01 K/uL    NEUTROPHILS PENDING %    LYMPHOCYTES PENDING %    MONOCYTES PENDING %    EOSINOPHILS PENDING %    BASOPHILS PENDING %    IMMATURE GRANULOCYTES PENDING %    ABS. NEUTROPHILS PENDING K/UL    ABS. LYMPHOCYTES PENDING K/UL    ABS. MONOCYTES PENDING K/UL    ABS. EOSINOPHILS PENDING K/UL    ABS. BASOPHILS PENDING K/UL    ABS. IMM. GRANS.  PENDING K/UL    DF PENDING    METABOLIC PANEL, COMPREHENSIVE    Collection Time: 09/20/22  7:58 AM   Result Value Ref Range    Sodium 136 136 - 145 mmol/L    Potassium 4.6 3.5 - 5.1 mmol/L    Chloride 103 97 - 108 mmol/L    CO2 14 (LL) 21 - 32 mmol/L    Anion gap 19 (H) 5 - 15 mmol/L    Glucose 315 (H) 65 - 100 mg/dL    BUN 34 (H) 6 - 20 mg/dL    Creatinine 2.75 (H) 0.55 - 1.02 mg/dL    BUN/Creatinine ratio 12 12 - 20      GFR est AA 21 (L) >60 ml/min/1.73m2    GFR est non-AA 18 (L) >60 ml/min/1.73m2    Calcium 9.8 8.5 - 10.1 mg/dL    Bilirubin, total 1.0 0.2 - 1.0 mg/dL    AST (SGOT) 96 (H) 15 - 37 U/L    ALT (SGPT) 71 12 - 78 U/L    Alk. phosphatase 130 (H) 45 - 117 U/L    Protein, total 5.6 (L) 6.4 - 8.2 g/dL    Albumin 3.0 (L) 3.5 - 5.0 g/dL    Globulin 2.6 2.0 - 4.0 g/dL    A-G Ratio 1.2 1.1 - 2.2     GLUCOSE, POC    Collection Time: 09/20/22  8:07 AM   Result Value Ref Range    Glucose (POC) 392 (H) 65 - 100 mg/dL    Performed by [address] [place] [address],[address], POC    Collection Time: 09/20/22 10:10 AM   Result Value Ref Range    Glucose (POC) 266 (H) 65 - 100 mg/dL    Performed by LALIT GREGORIO    HGB & HCT    Collection Time: 09/20/22 12:00 PM   Result Value Ref Range    HGB 16.8 (H) 11.5 - 16.0 g/dL    HCT 50.1 (H) 35.0 - 47.0 %          Intake/Output Summary (Last 24 hours) at 9/20/2022 1415  Last data filed at 9/20/2022 1230  Gross per 24 hour   Intake --   Output 400 ml   Net -400 ml      Current Shift: 09/20 0701 - 09/20 1900  In: -   Out: 400   Last 3 Shifts: No intake/output data recorded. Physical Exam  Vitals and nursing note reviewed. Constitutional:       Appearance: She is obese. She is ill-appearing. HENT:      Head: Normocephalic and atraumatic. Nose: Nose normal.      Mouth/Throat:      Mouth: Mucous membranes are moist.      Pharynx: Oropharynx is clear. Eyes:      Conjunctiva/sclera: Conjunctivae normal.   Cardiovascular:      Rate and Rhythm: Regular rhythm. Tachycardia present. Pulses: Normal pulses. Heart sounds: Normal heart sounds. Pulmonary:      Effort: Pulmonary effort is normal.      Breath sounds: Normal breath sounds.    Abdominal: General: There is distension. Musculoskeletal:      Right lower leg: No edema. Left lower leg: No edema. Skin:     General: Skin is warm and dry. Coloration: Skin is not pale. Findings: No erythema. Neurological:      General: No focal deficit present. Mental Status: She is alert and oriented to person, place, and time. NG tube in place with coffee-ground aspirate    Data Review:   Recent Results (from the past 24 hour(s))   CBC WITH AUTOMATED DIFF    Collection Time: 09/19/22  9:12 PM   Result Value Ref Range    WBC 10.2 3.6 - 11.0 K/uL    RBC 6.17 (H) 3.80 - 5.20 M/uL    HGB 19.0 (H) 11.5 - 16.0 g/dL    HCT 55.7 (H) 35.0 - 47.0 %    MCV 90.3 80.0 - 99.0 FL    MCH 30.8 26.0 - 34.0 PG    MCHC 34.1 30.0 - 36.5 g/dL    RDW 13.8 11.5 - 14.5 %    PLATELET 442 029 - 486 K/uL    MPV 10.0 8.9 - 12.9 FL    NRBC 0.0 0.0  WBC    ABSOLUTE NRBC 0.00 0.00 - 0.01 K/uL    NEUTROPHILS 79 (H) 32 - 75 %    LYMPHOCYTES 14 12 - 49 %    MONOCYTES 6 5 - 13 %    EOSINOPHILS 0 0 - 7 %    BASOPHILS 1 0 - 1 %    IMMATURE GRANULOCYTES 0 0 - 0.5 %    ABS. NEUTROPHILS 8.0 1.8 - 8.0 K/UL    ABS. LYMPHOCYTES 1.5 0.8 - 3.5 K/UL    ABS. MONOCYTES 0.6 0.0 - 1.0 K/UL    ABS. EOSINOPHILS 0.0 0.0 - 0.4 K/UL    ABS. BASOPHILS 0.1 0.0 - 0.1 K/UL    ABS. IMM. GRANS. 0.0 0.00 - 0.04 K/UL    DF AUTOMATED     METABOLIC PANEL, COMPREHENSIVE    Collection Time: 09/19/22  9:12 PM   Result Value Ref Range    Sodium 133 (L) 136 - 145 mmol/L    Potassium 3.9 3.5 - 5.1 mmol/L    Chloride 102 97 - 108 mmol/L    CO2 15 (LL) 21 - 32 mmol/L    Anion gap 16 (H) 5 - 15 mmol/L    Glucose 397 (H) 65 - 100 mg/dL    BUN 21 (H) 6 - 20 mg/dL    Creatinine 1.82 (H) 0.55 - 1.02 mg/dL    BUN/Creatinine ratio 12 12 - 20      GFR est AA 35 (L) >60 ml/min/1.73m2    GFR est non-AA 29 (L) >60 ml/min/1.73m2    Calcium 11.0 (H) 8.5 - 10.1 mg/dL    Bilirubin, total 0.9 0.2 - 1.0 mg/dL    AST (SGOT) 41 (H) 15 - 37 U/L    ALT (SGPT) 62 12 - 78 U/L    Alk. phosphatase 93 45 - 117 U/L    Protein, total 6.9 6.4 - 8.2 g/dL    Albumin 3.5 3.5 - 5.0 g/dL    Globulin 3.4 2.0 - 4.0 g/dL    A-G Ratio 1.0 (L) 1.1 - 2.2     LIPASE    Collection Time: 09/19/22  9:12 PM   Result Value Ref Range    Lipase 23 (L) 73 - 393 U/L   ACETAMINOPHEN    Collection Time: 09/20/22  3:27 AM   Result Value Ref Range    Acetaminophen level <2 (L) 10 - 30 ug/mL   URINALYSIS W/MICROSCOPIC    Collection Time: 09/20/22  3:30 AM   Result Value Ref Range    Color Laura      Appearance Cloudy (A) Clear      Specific gravity 1.025 1.003 - 1.030      pH (UA) 5.0      Protein 100 (A) Negative mg/dL    Glucose 100 (A) Negative mg/dL    Ketone 15 (A) Negative mg/dL    Bilirubin Moderate (A) Negative      Blood Small (A) Negative      Urobilinogen 4.0 (H) 0.2 - 1.0 EU/dL    Nitrites Negative Negative      Leukocyte Esterase Small (A) Negative      WBC 20-50 0 - 4 /hpf    RBC 5-10 0 - 5 /hpf    Bacteria 3+ (A) Negative /hpf    PRESUMPTIVE YEAST Present     CBC WITH AUTOMATED DIFF    Collection Time: 09/20/22  7:58 AM   Result Value Ref Range    WBC 7.1 3.6 - 11.0 K/uL    RBC 5.98 (H) 3.80 - 5.20 M/uL    HGB 18.2 (H) 11.5 - 16.0 g/dL    HCT 53.5 (H) 35.0 - 47.0 %    MCV 89.5 80.0 - 99.0 FL    MCH 30.4 26.0 - 34.0 PG    MCHC 34.0 30.0 - 36.5 g/dL    RDW 13.8 11.5 - 14.5 %    PLATELET 531 (L) 256 - 400 K/uL    MPV 10.9 8.9 - 12.9 FL    NRBC 0.0 0.0  WBC    ABSOLUTE NRBC 0.00 0.00 - 0.01 K/uL    NEUTROPHILS PENDING %    LYMPHOCYTES PENDING %    MONOCYTES PENDING %    EOSINOPHILS PENDING %    BASOPHILS PENDING %    IMMATURE GRANULOCYTES PENDING %    ABS. NEUTROPHILS PENDING K/UL    ABS. LYMPHOCYTES PENDING K/UL    ABS. MONOCYTES PENDING K/UL    ABS. EOSINOPHILS PENDING K/UL    ABS. BASOPHILS PENDING K/UL    ABS. IMM. GRANS.  PENDING K/UL    DF PENDING    METABOLIC PANEL, COMPREHENSIVE    Collection Time: 09/20/22  7:58 AM   Result Value Ref Range    Sodium 136 136 - 145 mmol/L    Potassium 4.6 3.5 - 5.1 mmol/L    Chloride 103 97 - 108 mmol/L    CO2 14 (LL) 21 - 32 mmol/L    Anion gap 19 (H) 5 - 15 mmol/L    Glucose 315 (H) 65 - 100 mg/dL    BUN 34 (H) 6 - 20 mg/dL    Creatinine 2.75 (H) 0.55 - 1.02 mg/dL    BUN/Creatinine ratio 12 12 - 20      GFR est AA 21 (L) >60 ml/min/1.73m2    GFR est non-AA 18 (L) >60 ml/min/1.73m2    Calcium 9.8 8.5 - 10.1 mg/dL    Bilirubin, total 1.0 0.2 - 1.0 mg/dL    AST (SGOT) 96 (H) 15 - 37 U/L    ALT (SGPT) 71 12 - 78 U/L    Alk. phosphatase 130 (H) 45 - 117 U/L    Protein, total 5.6 (L) 6.4 - 8.2 g/dL    Albumin 3.0 (L) 3.5 - 5.0 g/dL    Globulin 2.6 2.0 - 4.0 g/dL    A-G Ratio 1.2 1.1 - 2.2     GLUCOSE, POC    Collection Time: 09/20/22  8:07 AM   Result Value Ref Range    Glucose (POC) 392 (H) 65 - 100 mg/dL    Performed by Espinoza Powers    GLUCOSE, POC    Collection Time: 09/20/22 10:10 AM   Result Value Ref Range    Glucose (POC) 266 (H) 65 - 100 mg/dL    Performed by LAILT GREGORIO    HGB & HCT    Collection Time: 09/20/22 12:00 PM   Result Value Ref Range    HGB 16.8 (H) 11.5 - 16.0 g/dL    HCT 50.1 (H) 35.0 - 47.0 %         XR CHEST PORT   Final Result   1. Gastric tube likely terminates in the stomach. CT ABD PELV WO CONT   Final Result   Dilated fluid-filled proximal colon; partial colonic obstruction is not   excluded. Moderate amount of high attenuation stool in the sigmoid colon and   rectum. Small ascites. Cholecystolithiasis. Hepatic steatosis. Patient Active Problem List   Diagnosis Code    Bowel obstruction ([facility] [address] K56.609        DIAGNOSES:  Acute kidney injury  Non anion gap metabolic acidosis  Colonic obstruction  Obesity  Deconditioning  Fatty liver  Erythrocytosis due to chronic hypoxia  Pseudohyponatremia-corrected sodium is around 136. DISCUSSION:  I would agree with continue with fluid resuscitation  Due to exaggerated acidosis, would suggest a bicarbonate drip    PLAN:  Switch to bicarbonate drip.   Discontinue normal saline  Track renal panel in the a.m. tomorrow. Renal ultrasound  Control blood glucoses        Thanks for consulting me. Please don't hesitate to contact me if any questions arise of if I can assist in any manner. This dictation was done by dragon, computer voice recognition software. Often unanticipated grammatical, syntax, phones and other interpretive errors are inadvertently transcribed. Please excuse errors that have escaped final proofreading. Please contact me if you suspect dictation or transcription errors.   Dr Haleigh Coburn  [address] [place] [address], [address] [place] [address], [address] [place] [address]  Cell Phone: [phone]  Office phone: [phone]  Fax: [phone]

## 2022-09-20 NOTE — PROGRESS NOTES
Administered soap estrella enema too pt. Pt was only able to tolerated 1/2 of the bag. Informed pt she would be receiving several more.  Pt stated \" I am not taking any more I can not lay on my side for you to do that\"

## 2022-09-20 NOTE — PROGRESS NOTES
Physician Progress Note      Mireya LERMA #:                  [id number]  :                       1965  ADMIT DATE:       2022 8:58 PM  [address] [place] DATE:  RESPONDING  PROVIDER #:        Aleks Sotelo MD          QUERY TEXT:    Dear Dr. Nicolás Morgan,    Pt admitted with abdominal pain, SBO, UTI. Pt noted to have tachycardia, hypotension, tachypnea, increase in serum creatinine. If possible, please document in the progress notes and discharge summary if you are evaluating and /or treating any of the following: The medical record reflects the following:  Risk Factors: 60 F presented with abdominal pain; reports taking Immodium regularly and not having bowel movement for 3 days; admitted with bowel obstruction, UTI  Clinical Indicators: WBC WNL; on , patient with temp 96.2, tachycardia (115-127), tachypnea (30-34), hypotension requiring vasopressor support (90/69. ..85/60. ..76/55. ..88/63. ..97/70); serum creatinine 1.82 -> 2.75  Treatment: labs, CT, IV Zosyn, IV fluids, IV Norepinephrine (), xf to ICU    Thank you,  IRIS RodriguezN, RN, [facility]  Clinical   [email or web address] or contact via [employer]  Options provided:  -- Sepsis, present on admission  -- Sepsis, not present on admission  -- Sepsis was ruled out  -- Other - I will add my own diagnosis  -- Disagree - Not applicable / Not valid  -- Disagree - Clinically unable to determine / Unknown  -- Refer to Clinical Documentation Reviewer    PROVIDER RESPONSE TEXT:    This patient has sepsis that was not present on admission. Query created by: Mk Muller on 2022 12:54 PM      QUERY TEXT:    Dear Dr. Nicolás Morgan,    Pt admitted with bowel obstruction, UTI. Pt noted to have an increase in serum creatinine. If possible, please document in the progress notes and discharge summary if you are evaluating and/or treating any of the following:     The medical record reflects the following:  Risk Factors: 60 F presented with abdominal pain; admitted with bowel obstruction, UTI  Clinical Indicators: serum creatinine 1.82. ..2. 75; patient with underlying DM type 2  Treatment: labs, IV fluids    Defined by Kidney Disease Improving Global Outcomes (KDIGO) clinical practice guideline for acute kidney injury:  -Increase in SCr by greater than or equal to 0.3 mg/dl within 48?hours; or  -Increase or decrease in SCr to greater than or equal to 1.5 times baseline, which is known or presumed to have occurred within the prior 7 days; or  -Urine volume < 0.5ml/kg/h for 6 hours    Thank you,  Airam Julian, BSN, RN, CRCR  Clinical   [email or web address] or contact via [employer]  Options provided:  -- Acute kidney failure  -- Acute kidney failure with acute tubular necrosis  -- Acute kidney injury  -- Other - I will add my own diagnosis  -- Disagree - Not applicable / Not valid  -- Disagree - Clinically unable to determine / Unknown  -- Refer to Clinical Documentation Reviewer    PROVIDER RESPONSE TEXT:    This patient has an Acute kidney injury. Query created by: Mikayla Becker on 9/20/2022 12:56 PM      QUERY TEXT:    Dear Dr. Paty Montejo,    Pt admitted with bowel obstruction and UTI. Pt noted to have hypotension requiring vasopressor support and trasnfer to ICU. If possible, please document in the progress notes and discharge summary if you are evaluating and/or treating any of the following: The medical record reflects the following:  Risk Factors: 60 F presented with abdominal pain; reports taking Immodium regularly and not having bowel movement for 3 days; admitted with bowel obstruction, UTI  Clinical Indicators: WBC WNL; on 9/20, patient with temp 96.2, tachycardia (115-127), tachypnea (30-34), hypotension requiring vasopressor support (90/69. ..85/60. ..76/55. ..88/63. ..97/70); serum creatinine 1.82 -> 2.75  Treatment: labs, CT, IV Zosyn, IV fluids, IV Norepinephrine (9/20), xf to ICU    Thank you,  Jay Ladd, IRISN, RN, [facility]  Clinical   [email or web address] or contact via [employer]  Options provided:  -- Septic Shock  -- Hypovolemic Shock  -- Hypovolemia without Shock  -- Hypotension without Shock  -- Other - I will add my own diagnosis  -- Disagree - Not applicable / Not valid  -- Disagree - Clinically unable to determine / Unknown  -- Refer to Clinical Documentation Reviewer    PROVIDER RESPONSE TEXT:    This patient has Hypovolemic Shock.     Query created by: Renuka Bowden on 9/20/2022 12:58 PM      Electronically signed by:  Margarito Zabala MD 9/20/2022 5:16 PM

## 2022-09-20 NOTE — PROGRESS NOTES
Patient developed a rash on her lower extremities which is now spreading elsewhere. This occurred after she received Zosyn. We will discontinue Zosyn. Given her current blood pressure hold off on Benadryl unless the rash worsens.

## 2022-09-20 NOTE — PROGRESS NOTES
Called and informed Dr. Cielo Laws of the pt vitals. HE ordered a 1 liter bolus and stat labs. Orders received and entered.

## 2022-09-20 NOTE — PROGRESS NOTES
Noted Dr. Rodriguez Mom note to switch to bicarb, however, no order in place. MD notified and stated to continue to administer LR at 125ml/hr. Will continue to monitor.

## 2022-09-20 NOTE — PROGRESS NOTES
Pt allowed for second enema to be administered. Pt was only able to tolerated 1/4 of the bag. Educated the pt on the importance of holding the enema in her rectum.

## 2022-09-20 NOTE — PROGRESS NOTES
Called lab and informed them of pt stat labs. Lab stated a phlebotomist was on the floor and would be there to draw it shortly.

## 2022-09-21 PROBLEM — R06.89 HYPERCARBIA: Status: ACTIVE | Noted: 2022-01-01

## 2022-09-21 PROBLEM — K76.0 FATTY LIVER: Status: ACTIVE | Noted: 2022-01-01

## 2022-09-21 PROBLEM — E87.20 LACTIC ACIDOSIS: Status: ACTIVE | Noted: 2022-01-01

## 2022-09-21 PROBLEM — E66.9 OBESITY: Status: ACTIVE | Noted: 2022-01-01

## 2022-09-21 PROBLEM — N17.9 ACUTE KIDNEY FAILURE (HCC): Status: ACTIVE | Noted: 2022-01-01

## 2022-09-21 PROBLEM — E16.2 HYPOGLYCEMIA: Status: ACTIVE | Noted: 2022-01-01

## 2022-09-21 PROBLEM — N39.0 UTI (URINARY TRACT INFECTION): Status: ACTIVE | Noted: 2022-01-01

## 2022-09-21 NOTE — ROUTINE PROCESS
Spoke with [address] with [employer], they will continue to follow and to call back in about an hour to check status of patient.

## 2022-09-21 NOTE — CONSULTS
Pulmonology and Critical Care Consult    Subjective:   Consult Note: 9/21/2022 1:07 PM    Chief Complaint:   Chief Complaint   Patient presents with    Abdominal Pain        This patient has been seen and evaluated at the request of Dr. Leeanna Castañeda. Patient is a 61-year-old  female with a history of obesity, Kailyn Signs cirrhosis, fibromyalgia, and type 2 diabetes mellitus who presented to the hospital with abdominal pain/distention and was found to have colonic stool retention and developed septic shock and acute hypoxic respiratory failure. Patient was seen and examined in her room in the ICU following a CODE BLUE event early this morning. Apparently she presented to the hospital on 9/19/2022 for significant abdominal pain/distention, likely secondary to significant colonic stool burden and likely partial/full obstruction due to a significant amount of Imodium use over the weekend. The story is hazy, but reportedly she did not want to use the bathroom at a friend's house this weekend, therefore was using quite a lot of Imodium. Undoubtedly this led to her gastrointestinal issues. On admission, her sodium was 133, serum bicarb was 15, and creatinine was up to 1.82; LFTs also elevated the following morning with an AST up to 96 and an ALT up to 71, lactate was later found to be 7.7. Of note, her CT of the abdomen/pelvis on admission showed a dilated fluid-filled proximal colon, partial colonic obstruction is not excluded with a high amount of high attenuation stool in the sigmoid colon and rectum. A repeat CT scan of the abdomen/pelvis on 9/20/2022 showed stable colonic and small bowel dilatation likely representing ileus versus partial obstruction, dilated gallbladder with a 9 mm stone in the neck. She slowly deteriorated throughout yesterday, with worsening mental status and respiratory status as well as acidosis.   She was given a copious amount of fluids and sodium bicarbonate pushes and eventually was intubated early this morning for acute metabolic encephalopathy. In the ICU, following intubation, she underwent a CODE BLUE event with a reported PEA arrest.  She was given several rounds of IV epinephrine as well as sodium bicarbonate pushes with ROSC. When I evaluated the patient, she was already on Levophed at 80 mcg/min, Chucho-Synephrine at 50, and Precedex at 1.4 as well as a bicarbonate drip at 100 cc/h (150 mEq of sodium bicarb). Her NG tube has been returning coffee-ground-like material, and her hemoglobin has dropped slightly from admission and is currently at 11.6. Her Protonix was increased to 40 mg IV twice daily. Multiple attempts were made to place an arterial line by myself, initially starting with a left radial arterial line which was unsuccessful due to the size of the vessel, and then eventually a left femoral arterial line which was halted due to a repeat CODE BLUE event. During the [address], she underwent several rounds of epinephrine, calcium chloride, and sodium bicarbonate. Her initial rhythm after ROSC was ventricular tachycardia with a pulse, therefore she was given 150 mg of IV amiodarone with improvement in her arrhythmia. I then discussed the case in detail with the patient's daughter, Johnson Edwards over the phone and she stated she wanted her mother to be full code, she will come up from [place] today. I then successfully placed a right femoral arterial line and a right femoral central line for more access. Of note, the hospitalist team was able to place a right IJ trialysis line earlier this morning prior to my arrival.  I discussed the case in detail with the surgical team, hospitalist team, and nephrology team as well as the patient's boyfriend at the bedside. Overall, the prognosis is very grim and her blood pressure is still quite low while maxed on Levophed/vasopressin/Chucho-Synephrine.   We will start the patient on epinephrine drip and continue on low-dose CRRT, but overall her prognosis is extremely poor. Of note, her most recent blood work as of 11 AM showed a sodium level of 137, potassium 8.9, serum bicarb 14, creatinine 4.52, phosphorus 11.3, , and AST greater than 2000 with a lactate level of 14. She is currently on AC//30/100%/5 and her most recent ABG unfortunately was 6.9 8/37/116 with a serum bicarb of 8. We will give her 2 A of sodium bicarb, continue her on the sodium bicarb drip, increase her respiratory rate to 36. Past Medical History:   Diagnosis Date    Diabetes ([facility] [address]     Fatty liver disease, nonalcoholic     Fibromyalgia      Past Surgical History:   Procedure Laterality Date    IR INSERT NON TUNL CVC OVER 5 YRS  9/21/2022      No family history on file.   Social History     Tobacco Use    Smoking status: Every Day     Types: Cigarettes    Smokeless tobacco: Never   Substance Use Topics    Alcohol use: Not Currently      Current Facility-Administered Medications   Medication Dose Route Frequency Provider Last Rate Last Admin    sodium bicarbonate (8.4%) 150 mEq in dextrose 5% 1,000 mL infusion   IntraVENous CONTINUOUS Tahira Bernal  mL/hr at 09/21/22 0537 New Bag at 09/21/22 0537    PHENYLephrine (JEAN-SYNEPHRINE) 30 mg in 0.9% sodium chloride 250 mL infusion   mcg/min IntraVENous TITRATE Tahira Bernal MD 15 mL/hr at 09/21/22 0828 30 mcg/min at 09/21/22 0828    sodium bicarbonate 8.4 % (1 mEq/mL) injection             dexmedeTOMidine in 0.9 % NaCl (PRECEDEX) 400 mcg/100 mL (4 mcg/mL) infusion soln  0.1-1.5 mcg/kg/hr IntraVENous TITRATE Tahira Bernal MD 24.7 mL/hr at 09/21/22 0930 1 mcg/kg/hr at 09/21/22 0930    chlorhexidine (PERIDEX) 0.12 % mouthwash 15 mL  15 mL Oral Q12H Jethro Paula DO        vasopressin (VASOSTRICT) 40 Units in 0.9% sodium chloride 40 mL infusion  0.04 Units/min IntraVENous CONTINUOUS Jethro Paula DO 2.4 mL/hr at 09/21/22 0951 0.04 Units/min at 09/21/22 0951    albumin human 25% (BUMINATE) solution 25 g  25 g IntraVENous Q6H Jethro Paula DO        hydrocortisone Sod Succ (PF) (SOLU-CORTEF) injection 50 mg  50 mg IntraVENous Q6H eJthro Paula, DO        calcium gluconate 1 gram in sodium chloride (ISO-OSM) 50 mL infusion  1 g IntraVENous ONCE Jethro Paula DO        pantoprazole (PROTONIX) 40 mg in 0.9% sodium chloride 10 mL injection  40 mg IntraVENous Q12H Jethro Palua DO        midazolam in normal saline (VERSED) 1 mg/mL infusion  0-10 mg/hr IntraVENous TITRATE Jethro Paula DO        fentaNYL (PF) 1,500 mcg/30 mL (50 mcg/mL) infusion  0-200 mcg/hr IntraVENous TITRATE Jethro Paula DO        propofol (DIPRIVAN) 10 mg/mL infusion  0-50 mcg/kg/min IntraVENous TITRATE Ninfa Page DO   Held at 09/21/22 1100    vancomycin (VANCOCIN) 2,000 mg in 0.9% sodium chloride 500 mL IVPB  2,000 mg IntraVENous ONCE Jethro Paula DO        [START ON 9/22/2022] Vancomycin - please draw level 9/22 1200.     Other ONCE Dean Donald DO        VANCOMYCIN INFORMATION NOTE   Other Rx Dosing/Monitoring Dean Donald DO        EPINEPHrine (ADRENALIN) 0.1 mg/mL syringe    CODE Jethro Steward DO   1 mg at 09/21/22 1114    sodium bicarbonate 8.4 % (1 mEq/mL) injection   IntraVENous CODE Jethro Steward DO   50 mEq at 09/21/22 1113    calcium chloride 100 mg/mL (10 %) injection             sodium bicarbonate 8.4 % (1 mEq/mL) injection             calcium chloride injection   IntraVENous CODE BLUE Jethro Paula DO   1 g at 09/21/22 1118    amiodarone (CORDARONE) injection   IntraVENous CODE Jethro Steward DO   150 mg at 09/21/22 1120    calcium chloride 100 mg/mL (10 %) injection             calcium chloride injection 1 g  1 g IntraVENous ONCE Jethro Paula DO        NOREPINephrine (LEVOPHED) 32,000 mcg in dextrose 5% 250 mL (128 mcg/mL) infusion  0.5-120 mcg/min IntraVENous TITRATE Jethro Paula DO 4.7 mL/hr at 09/21/22 1210 10 mcg/min at 09/21/22 1210    EPINEPHrine (ADRENALIN) 10 mg in 0.9% sodium chloride 250 mL infusion  0-10 mcg/min IntraVENous TITRATE Sheng Paula,  15 mL/hr at 22 1300 10 mcg/min at 22 1300    HYDROmorphone (DILAUDID) syringe 0.5 mg  0.5 mg IntraVENous Q4H PRN Ingrid Harden MD   0.5 mg at 22 2238    clonazePAM (KlonoPIN) tablet 0.5 mg  0.5 mg Oral QHS PRN Ingrid Harden MD        glucose chewable tablet 16 g  4 Tablet Oral PRN Ingrid Harden MD        glucagon (GLUCAGEN) injection 1 mg  1 mg IntraMUSCular PRN Ingrid Harden MD        dextrose 10% infusion 0-250 mL  0-250 mL IntraVENous PRN Ingrid Harden  mL/hr at 22 0440 250 mL at 22 0440    diphenhydrAMINE (BENADRYL) injection 12.5 mg  12.5 mg IntraVENous Q6H PRN Marcy Parikh MD   12.5 mg at 22 1216    insulin lispro (HUMALOG) injection   SubCUTAneous Q4H Marcy Parikh MD   2 Units at 22 1421    fluconazole (DIFLUCAN) 200mg/100 mL IVPB (premix)  200 mg IntraVENous DAILY Marcy Parikh MD   Held at 22 0900    meropenem (MERREM) 1 g in 0.9% sodium chloride (MBP/ADV) 50 mL MBP  1 g IntraVENous Q12H Marcy Parikh MD 16.7 mL/hr at 22 0528 1 g at 22 0528    NOREPINephrine (LEVOPHED) 8 mg in 0.9% NS 250ml infusion  0.5-120 mcg/min IntraVENous TITRATE Jethro Paula .5 mL/hr at 22 1049 60 mcg/min at 22 1049        Home Meds:  No current facility-administered medications on file prior to encounter. No current outpatient medications on file prior to encounter. Allergies   Allergen Reactions    Piperacillin-Tazobactam Rash    Morphine Unknown (comments)       Review of Systems:  Review of systems not obtained due to patient factors. Objective:     Blood pressure (!) 97/59, pulse 93, temperature 99.6 °F (37.6 °C), resp. rate 28, height 5' 9\" (1.753 m), weight 98.9 kg (218 lb 0.6 oz), SpO2 (!) 42 %.  Temp (24hrs), Av.5 °F (37.5 °C), Min:97.7 °F (36.5 °C), Max:102.7 °F (39.3 °C)      Intake and Output:  Current Shift: No intake/output data recorded. Last 3 Shifts: 09/19 1901 - 09/21 0700  In: -   Out: 1050     Physical Exam:   General appearance: Chronically ill-appearing, sedated on the ventilator  Head: Normocephalic, without obvious abnormality, atraumatic  Eyes: negative  Neck: supple, symmetrical, trachea midline, no adenopathy, and right IJ dialysis catheter in place  Lungs: clear to auscultation bilaterally, ET tube in the appropriate location  Heart: regular rate and rhythm, S1, S2 normal, no murmur, click, rub or gallop  Abdomen: soft, distended, NG tube in place  Extremities: extremities normal, atraumatic, no significant edema but legs are mottled  Pulses: 2+ and symmetric  Skin: Mottled skin. No rashes or lesions  Lymph nodes: Cervical, supraclavicular, and axillary nodes normal.  Neurologic: Sedated on the ventilator, no obvious focal neurologic abnormality    Additional comments:None    Lab/Data Review: All lab results for the last 24 hours reviewed.   Recent Results (from the past 24 hour(s))   GLUCOSE, POC    Collection Time: 09/20/22  2:17 PM   Result Value Ref Range    Glucose (POC) 174 (H) 65 - 100 mg/dL    Performed by [address] [place] [address], POC    Collection Time: 09/20/22  6:00 PM   Result Value Ref Range    Glucose (POC) 120 (H) 65 - 100 mg/dL    Performed by Barbie Lerma    RENAL FUNCTION PANEL    Collection Time: 09/20/22  8:33 PM   Result Value Ref Range    Sodium 139 136 - 145 mmol/L    Potassium 5.2 (H) 3.5 - 5.1 mmol/L    Chloride 107 97 - 108 mmol/L    CO2 18 (L) 21 - 32 mmol/L    Anion gap 14 5 - 15 mmol/L    Glucose 98 65 - 100 mg/dL    BUN 52 (H) 6 - 20 mg/dL    Creatinine 3.32 (H) 0.55 - 1.02 mg/dL    BUN/Creatinine ratio 16 12 - 20      GFR est AA 17 (L) >60 ml/min/1.73m2    GFR est non-AA 14 (L) >60 ml/min/1.73m2    Calcium 7.6 (L) 8.5 - 10.1 mg/dL    Phosphorus 4.4 2.6 - 4.7 mg/dL    Albumin 2.1 (L) 3.5 - 5.0 g/dL   METABOLIC PANEL, COMPREHENSIVE    Collection Time: 09/20/22  8:33 PM Result Value Ref Range    Sodium 139 136 - 145 mmol/L    Potassium 5.2 (H) 3.5 - 5.1 mmol/L    Chloride 107 97 - 108 mmol/L    CO2 19 (L) 21 - 32 mmol/L    Anion gap 13 5 - 15 mmol/L    Glucose 96 65 - 100 mg/dL    BUN 53 (H) 6 - 20 mg/dL    Creatinine 3.33 (H) 0.55 - 1.02 mg/dL    BUN/Creatinine ratio 16 12 - 20      GFR est AA 17 (L) >60 ml/min/1.73m2    GFR est non-AA 14 (L) >60 ml/min/1.73m2    Calcium 7.3 (L) 8.5 - 10.1 mg/dL    Bilirubin, total 1.4 (H) 0.2 - 1.0 mg/dL    AST (SGOT) 545 (H) 15 - 37 U/L    ALT (SGPT) 209 (H) 12 - 78 U/L    Alk. phosphatase 199 (H) 45 - 117 U/L    Protein, total 4.1 (L) 6.4 - 8.2 g/dL    Albumin 2.1 (L) 3.5 - 5.0 g/dL    Globulin 2.0 2.0 - 4.0 g/dL    A-G Ratio 1.1 1.1 - 2.2     LACTIC ACID    Collection Time: 09/20/22  8:33 PM   Result Value Ref Range    Lactic acid 5.8 (HH) 0.4 - 2.0 mmol/L   CBC WITH AUTOMATED DIFF    Collection Time: 09/20/22  8:33 PM   Result Value Ref Range    WBC 2.9 (L) 3.6 - 11.0 K/uL    RBC 4.83 3.80 - 5.20 M/uL    HGB 14.6 11.5 - 16.0 g/dL    HCT 42.7 35.0 - 47.0 %    MCV 88.4 80.0 - 99.0 FL    MCH 30.2 26.0 - 34.0 PG    MCHC 34.2 30.0 - 36.5 g/dL    RDW 13.6 11.5 - 14.5 %    PLATELET 87 (L) 149 - 400 K/uL    MPV 11.0 8.9 - 12.9 FL    NRBC 0.0 0.0  WBC    ABSOLUTE NRBC 0.00 0.00 - 0.01 K/uL    NEUTROPHILS 66 32 - 75 %    LYMPHOCYTES 24 12 - 49 %    MONOCYTES 10 5 - 13 %    EOSINOPHILS 0 0 - 7 %    BASOPHILS 0 0 - 1 %    IMMATURE GRANULOCYTES 0 %    ABS. NEUTROPHILS 1.9 1.8 - 8.0 K/UL    ABS. LYMPHOCYTES 0.7 (L) 0.8 - 3.5 K/UL    ABS. MONOCYTES 0.3 0.0 - 1.0 K/UL    ABS. EOSINOPHILS 0.0 0.0 - 0.4 K/UL    ABS. BASOPHILS 0.0 0.0 - 0.1 K/UL    ABS. IMM.  GRANS. 0.0 K/UL    DF Manual      RBC COMMENTS Anisocytosis  2+       METABOLIC PANEL, COMPREHENSIVE    Collection Time: 09/20/22 11:52 PM   Result Value Ref Range    Sodium 138 136 - 145 mmol/L    Potassium 6.0 (H) 3.5 - 5.1 mmol/L    Chloride 107 97 - 108 mmol/L    CO2 19 (L) 21 - 32 mmol/L Anion gap 12 5 - 15 mmol/L    Glucose 65 65 - 100 mg/dL    BUN 57 (H) 6 - 20 mg/dL    Creatinine 3.65 (H) 0.55 - 1.02 mg/dL    BUN/Creatinine ratio 16 12 - 20      GFR est AA 15 (L) >60 ml/min/1.73m2    GFR est non-AA 13 (L) >60 ml/min/1.73m2    Calcium 7.4 (L) 8.5 - 10.1 mg/dL    Bilirubin, total 1.6 (H) 0.2 - 1.0 mg/dL    AST (SGOT) 778 (H) 15 - 37 U/L    ALT (SGPT) 271 (H) 12 - 78 U/L    Alk.  phosphatase 239 (H) 45 - 117 U/L    Protein, total 4.2 (L) 6.4 - 8.2 g/dL    Albumin 2.1 (L) 3.5 - 5.0 g/dL    Globulin 2.1 2.0 - 4.0 g/dL    A-G Ratio 1.0 (L) 1.1 - 2.2     CBC W/O DIFF    Collection Time: 09/20/22 11:52 PM   Result Value Ref Range    WBC 3.7 3.6 - 11.0 K/uL    RBC 4.78 3.80 - 5.20 M/uL    HGB 14.3 11.5 - 16.0 g/dL    HCT 42.7 35.0 - 47.0 %    MCV 89.3 80.0 - 99.0 FL    MCH 29.9 26.0 - 34.0 PG    MCHC 33.5 30.0 - 36.5 g/dL    RDW 13.7 11.5 - 14.5 %    PLATELET 76 (L) 211 - 400 K/uL    MPV 11.5 8.9 - 12.9 FL    NRBC 0.0 0.0  WBC    ABSOLUTE NRBC 0.00 0.00 - 0.01 K/uL   GLUCOSE, POC    Collection Time: 09/21/22 12:58 AM   Result Value Ref Range    Glucose (POC) 30 (LL) 65 - 100 mg/dL    Performed by [address] [place] [address], POC    Collection Time: 09/21/22  1:00 AM   Result Value Ref Range    Glucose (POC) 44 (LL) 65 - 100 mg/dL    Performed by [facility]    BLOOD GAS, ARTERIAL    Collection Time: 09/21/22  1:12 AM   Result Value Ref Range    pH 7.10 (LL) 7.35 - 7.45      PCO2 46 (H) 35 - 45 mmHg    PO2 98 80 - 100 mmHg    O2 SATURATION 95 95 - 99 %    BICARBONATE 14 (L) 22 - 26 mmol/L    BASE DEFICIT 15.0 mmol/L    O2 METHOD BiPAP      FIO2 50 %    Tidal volume 550.0      SET RATE 18      PEEP/CPAP 5.0      Sample source Arterial      SITE Left Brachial      ROMA'S TEST NOT APPLICABLE      Carboxy-Hgb 1.2 1 - 2 %    Methemoglobin 0.0 0 - 1.4 %    Oxyhemoglobin 93.9 (L) 95 - 99 %    Performed by Bernadette Austin     Critical value read back Called to  [facility] on 09/21/2022 at 01:17     TEMPERATURE 100.3     GLUCOSE, POC    Collection Time: 09/21/22  1:21 AM   Result Value Ref Range    Glucose (POC) 84 65 - 100 mg/dL    Performed by [facility]    LACTIC ACID    Collection Time: 09/21/22  1:25 AM   Result Value Ref Range    Lactic acid 5.9 (HH) 0.4 - 2.0 mmol/L   BLOOD GAS, ARTERIAL    Collection Time: 09/21/22  4:28 AM   Result Value Ref Range    pH 7.15 (LL) 7.35 - 7.45      PCO2 43 35 - 45 mmHg    PO2 84 80 - 100 mmHg    O2 SATURATION 95 95 - 99 %    BICARBONATE 15 (L) 22 - 26 mmol/L    BASE DEFICIT 13.9 mmol/L    O2 METHOD BiPAP      FIO2 50 %    Tidal volume 550.0      SET RATE 18      PEEP/CPAP 5.0      Sample source Arterial      SITE Right Radial      ROMA'S TEST NOT APPLICABLE      Carboxy-Hgb 1.7 1 - 2 %    Methemoglobin 0.3 0 - 1.4 %    Oxyhemoglobin 93.1 (L) 95 - 99 %    Performed by Victor Manuel Cedillo     Critical value read back Called to  [facility] on 09/21/2022 at 04:31     TEMPERATURE 100.3     GLUCOSE, POC    Collection Time: 09/21/22  4:35 AM   Result Value Ref Range    Glucose (POC) 46 (LL) 65 - 100 mg/dL    Performed by Storoneel Ray    GLUCOSE, POC    Collection Time: 09/21/22  4:36 AM   Result Value Ref Range    Glucose (POC) 34 (LL) 65 - 100 mg/dL    Performed by Storoneel Ray    GLUCOSE, POC    Collection Time: 09/21/22  5:40 AM   Result Value Ref Range    Glucose (POC) 139 (H) 65 - 100 mg/dL    Performed by Adrianna Roy    RENAL FUNCTION PANEL    Collection Time: 09/21/22  5:45 AM   Result Value Ref Range    Sodium 136 136 - 145 mmol/L    Potassium 7.3 (HH) 3.5 - 5.1 mmol/L    Chloride 103 97 - 108 mmol/L    CO2 21 21 - 32 mmol/L    Anion gap 12 5 - 15 mmol/L    Glucose 262 (H) 65 - 100 mg/dL    BUN 59 (H) 6 - 20 mg/dL    Creatinine 3.95 (H) 0.55 - 1.02 mg/dL    BUN/Creatinine ratio 15 12 - 20      GFR est AA 14 (L) >60 ml/min/1.73m2    GFR est non-AA 12 (L) >60 ml/min/1.73m2    Calcium 6.5 (L) 8.5 - 10.1 mg/dL    Phosphorus 8.2 (H) 2.6 - 4.7 mg/dL    Albumin 1.6 (L) 3.5 - 5.0 g/dL BLOOD GAS, ARTERIAL    Collection Time: 09/21/22  5:57 AM   Result Value Ref Range    pH 7.06 (LL) 7.35 - 7.45      PCO2 61 (H) 35 - 45 mmHg    PO2 195 (H) 80 - 100 mmHg    O2 SATURATION 99 95 - 99 %    BICARBONATE 16 (L) 22 - 26 mmol/L    BASE DEFICIT 13.4 mmol/L    O2 METHOD BiPAP      FIO2 100 %    Tidal volume 550.0      SET RATE 18      PEEP/CPAP 5.0      Sample source Arterial      SITE Left Brachial      ROMA'S TEST NOT APPLICABLE      Carboxy-Hgb 1.0 1 - 2 %    Methemoglobin 0.0 0 - 1.4 %    Oxyhemoglobin 97.7 95 - 99 %    Performed by Brian Perez     Critical value read back       Called to [address] [place] [address] Drive on 09/21/2022 at 06:00    TEMPERATURE 102.7     GLUCOSE, POC    Collection Time: 09/21/22  7:15 AM   Result Value Ref Range    Glucose (POC) 35 (LL) 65 - 100 mg/dL    Performed by Dang Dage    GLUCOSE, POC    Collection Time: 09/21/22  7:17 AM   Result Value Ref Range    Glucose (POC) 79 65 - 100 mg/dL    Performed by Dang Dage    GLUCOSE, POC    Collection Time: 09/21/22  8:45 AM   Result Value Ref Range    Glucose (POC) 38 (LL) 65 - 100 mg/dL    Performed by Joanna Sosa RN ([facility])    CBC WITH AUTOMATED DIFF    Collection Time: 09/21/22  9:30 AM   Result Value Ref Range    WBC 3.9 3.6 - 11.0 K/uL    RBC 3.76 (L) 3.80 - 5.20 M/uL    HGB 11.6 11.5 - 16.0 g/dL    HCT 35.2 35.0 - 47.0 %    MCV 93.6 80.0 - 99.0 FL    MCH 30.9 26.0 - 34.0 PG    MCHC 33.0 30.0 - 36.5 g/dL    RDW 14.5 11.5 - 14.5 %    PLATELET 53 (L) 291 - 400 K/uL    MPV 11.4 8.9 - 12.9 FL    NRBC 1.0 (H) 0.0  WBC    ABSOLUTE NRBC 0.04 (H) 0.00 - 0.01 K/uL    NEUTROPHILS 52 32 - 75 %    BAND NEUTROPHILS 7 (H) 0 - 6 %    LYMPHOCYTES 18 12 - 49 %    MONOCYTES 2 (L) 5 - 13 %    EOSINOPHILS 0 0 - 7 %    BASOPHILS 0 0 - 1 %    METAMYELOCYTES 15 (H) 0 %    MYELOCYTES 6 (H) 0 %    IMMATURE GRANULOCYTES 0 %    ABS. NEUTROPHILS 2.3 1.8 - 8.0 K/UL    ABS. LYMPHOCYTES 0.7 (L) 0.8 - 3.5 K/UL    ABS.  MONOCYTES 0.1 0.0 - 1.0 K/UL    ABS. EOSINOPHILS 0.0 0.0 - 0.4 K/UL    ABS. BASOPHILS 0.0 0.0 - 0.1 K/UL    ABS. IMM. GRANS. 0.0 K/UL    DF Manual      RBC COMMENTS [place] cells  3+       AMMONIA    Collection Time: 09/21/22  9:30 AM   Result Value Ref Range    Ammonia, plasma 239 (H) <32 umol/L   LACTIC ACID    Collection Time: 09/21/22  9:30 AM   Result Value Ref Range    Lactic acid 14.3 (HH) 0.4 - 2.0 mmol/L   HEPATIC FUNCTION PANEL    Collection Time: 09/21/22 11:05 AM   Result Value Ref Range    Protein, total 3.9 (L) 6.4 - 8.2 g/dL    Albumin 1.6 (L) 3.5 - 5.0 g/dL    Globulin 2.3 2.0 - 4.0 g/dL    A-G Ratio 0.7 (L) 1.1 - 2.2      Bilirubin, total 2.1 (H) 0.2 - 1.0 mg/dL    Bilirubin, direct 1.6 (H) 0.0 - 0.2 mg/dL    Alk. phosphatase 379 (H) 45 - 117 U/L    AST (SGOT) >2,000 (H) 15 - 37 U/L    ALT (SGPT) 732 (H) 12 - 78 U/L   MAGNESIUM    Collection Time: 09/21/22 11:05 AM   Result Value Ref Range    Magnesium 2.1 1.6 - 2.4 mg/dL   METABOLIC PANEL, COMPREHENSIVE    Collection Time: 09/21/22 11:05 AM   Result Value Ref Range    Sodium 137 136 - 145 mmol/L    Potassium 8.9 (HH) 3.5 - 5.1 mmol/L    Chloride 104 97 - 108 mmol/L    CO2 14 (LL) 21 - 32 mmol/L    Anion gap 19 (H) 5 - 15 mmol/L    Glucose 67 65 - 100 mg/dL    BUN 62 (H) 6 - 20 mg/dL    Creatinine 4.52 (H) 0.55 - 1.02 mg/dL    BUN/Creatinine ratio 14 12 - 20      GFR est AA 12 (L) >60 ml/min/1.73m2    GFR est non-AA 10 (L) >60 ml/min/1.73m2    Calcium 7.5 (L) 8.5 - 10.1 mg/dL    Bilirubin, total 2.1 (H) 0.2 - 1.0 mg/dL    AST (SGOT) >2,000 (H) 15 - 37 U/L    ALT (SGPT) 728 (H) 12 - 78 U/L    Alk.  phosphatase 393 (H) 45 - 117 U/L    Protein, total 4.0 (L) 6.4 - 8.2 g/dL    Albumin 1.6 (L) 3.5 - 5.0 g/dL    Globulin 2.4 2.0 - 4.0 g/dL    A-G Ratio 0.7 (L) 1.1 - 2.2     RENAL FUNCTION PANEL    Collection Time: 09/21/22 11:05 AM   Result Value Ref Range    Sodium 137 136 - 145 mmol/L    Potassium 8.9 (HH) 3.5 - 5.1 mmol/L    Chloride 104 97 - 108 mmol/L    CO2 14 (LL) 21 - 32 mmol/L    Anion gap 19 (H) 5 - 15 mmol/L    Glucose 66 65 - 100 mg/dL    BUN 61 (H) 6 - 20 mg/dL    Creatinine 4.51 (H) 0.55 - 1.02 mg/dL    BUN/Creatinine ratio 14 12 - 20      GFR est AA 12 (L) >60 ml/min/1.73m2    GFR est non-AA 10 (L) >60 ml/min/1.73m2    Calcium 7.5 (L) 8.5 - 10.1 mg/dL    Phosphorus 11.3 (H) 2.6 - 4.7 mg/dL    Albumin 1.6 (L) 3.5 - 5.0 g/dL   GLUCOSE, POC    Collection Time: 09/21/22 11:10 AM   Result Value Ref Range    Glucose (POC) 69 65 - 100 mg/dL    Performed by Dang Dudley    BLOOD GAS, ARTERIAL    Collection Time: 09/21/22 12:42 PM   Result Value Ref Range    pH 6.98 (LL) 7.35 - 7.45      PCO2 37 35 - 45 mmHg    PO2 116 (H) 80 - 100 mmHg    O2 SATURATION 96 95 - 99 %    BICARBONATE 8 (L) 22 - 26 mmol/L    BASE DEFICIT 22.2 mmol/L    O2 METHOD VENT      FIO2 100 %    MODE ASSIST CONTROL      Tidal volume 500.0      SET RATE 30      PEEP/CPAP 5.0      Sample source Arterial      SITE DRAWN FROM ARTERIAL LINE      ROMA'S TEST YES      Carboxy-Hgb 0.9 (L) 1 - 2 %    Methemoglobin 0.3 0 - 1.4 %    Oxyhemoglobin 95.0 95 - 99 %    Performed by Sunitha Franz     Critical value read back Called to aleksandra moore md on 09/21/2022 at 12:56     TEMPERATURE 98.6           Chest X-Ray:   XR CHEST PORT   Final Result   No significant change. IR INSERT NON TUNL CVC OVER 5 YRS   Final Result   Technically successful placement of an ultrasound guided trialysis   catheter via the right internal jugular vein, as described above. XR CHEST PORT   Final Result   1. Hypoventilatory lungs with mild bibasilar opacities most suggestive of   atelectasis. XR ABD (KUB)   Final Result   Persistent dilated small bowel loops. Nasogastric tube tip over the gastric   fundus. Large amount of colonic stool. XR CHEST PORT   Final Result   No significant change. CT ABD PELV WO CONT   Final Result   1. Stable colonic and small bowel dilatation.  Findings could represent ileus   versus partial obstruction. Further radiographic follow-up may be of benefit. 2.  Dilated gallbladder with 9 mm stone in the neck. Consider ultrasound   evaluation if there is concern for acute cholecystitis. 3.  Additional findings as above. XR CHEST PORT   Final Result   1. Gastric tube likely terminates in the stomach. CT ABD PELV WO CONT   Final Result   Dilated fluid-filled proximal colon; partial colonic obstruction is not   excluded. Moderate amount of high attenuation stool in the sigmoid colon and   rectum. Small ascites. Cholecystolithiasis. Hepatic steatosis. IR US GUIDED VASCULAR ACCESS    (Results Pending)   XR CHEST PORT    (Results Pending)   XR CHEST PORT    (Results Pending)   XR CHEST PORT    (Results Pending)   US ABD COMP    (Results Pending)       CT imaging:  CT Results  (Last 48 hours)                 09/20/22 1706  CT ABD PELV WO CONT Final result    Impression:  1. Stable colonic and small bowel dilatation. Findings could represent ileus   versus partial obstruction. Further radiographic follow-up may be of benefit. 2.  Dilated gallbladder with 9 mm stone in the neck. Consider ultrasound   evaluation if there is concern for acute cholecystitis. 3.  Additional findings as above. Narrative:  EXAM: CT ABD PELV WO CONT       INDICATION: worsening abdomen       COMPARISON: CT abdomen pelvis from 9/19/2022       IV CONTRAST: None. ORAL CONTRAST: None       TECHNIQUE:    Thin axial images were obtained through the abdomen and pelvis. Coronal and   sagittal reformats were generated. CT dose reduction was achieved through use of   a standardized protocol tailored for this examination and automatic exposure   control for dose modulation. The absence of intravenous contrast material reduces the sensitivity for   evaluation of the vasculature and solid organs. FINDINGS:    LOWER THORAX: Trace bilateral pleural effusions. LIVER: No mass.    BILIARY TREE: Distended gallbladder with 9 mm stone in the neck. CBD is not   dilated. SPLEEN: within normal limits. PANCREAS: No focal abnormality. ADRENALS: Unremarkable. KIDNEYS/URETERS: No calculus or hydronephrosis. STOMACH: Unremarkable. SMALL BOWEL: Mild distention of small bowel with air-fluid levels, not   significantly changed. COLON: Dilated fluid-filled:, Not significant changed from prior study. Oral   contrast seen within the rectum. APPENDIX: Unremarkable. PERITONEUM: Mild ascites. No pneumoperitoneum. RETROPERITONEUM: No lymphadenopathy or aortic aneurysm. REPRODUCTIVE ORGANS: Uterus is unremarkable. No adnexal masses. URINARY BLADDER: No mass or calculus. BONES: No destructive bone lesion. ABDOMINAL WALL: No mass or hernia. ADDITIONAL COMMENTS: N/A           09/19/22 2222  CT ABD PELV WO CONT Final result    Impression:  Dilated fluid-filled proximal colon; partial colonic obstruction is not   excluded. Moderate amount of high attenuation stool in the sigmoid colon and   rectum. Small ascites. Cholecystolithiasis. Hepatic steatosis. Narrative:  EXAM: CT ABD PELV WO CONT       INDICATION: Abdominal pain and distention        COMPARISON: None       IV CONTRAST: None. ORAL CONTRAST: None       TECHNIQUE:    Thin axial images were obtained through the abdomen and pelvis. Coronal and   sagittal reformats were generated. CT dose reduction was achieved through use of   a standardized protocol tailored for this examination and automatic exposure   control for dose modulation. The absence of intravenous contrast material reduces the sensitivity for   evaluation of the vasculature and solid organs. FINDINGS:    LOWER THORAX: No significant abnormality in the incidentally imaged lower chest.   LIVER: Enlarged. Mild diffuse steatosis. BILIARY TREE: Gallbladder is mildly distended. There is a 6 mm calculus in the   gallbladder neck.  No gallbladder wall thickening is evident. CBD is not dilated. SPLEEN: within normal limits. PANCREAS: No focal abnormality. ADRENALS: Unremarkable. KIDNEYS/URETERS: No calculus or hydronephrosis. STOMACH: Unremarkable. SMALL BOWEL: No dilatation or wall thickening. COLON: Diffusely distended and fluid-filled. Moderate amount of dense stool in   the sigmoid colon and rectum. APPENDIX: Unremarkable. PERITONEUM: Small ascites. RETROPERITONEUM: No lymphadenopathy or aortic aneurysm. REPRODUCTIVE ORGANS: Unremarkable. URINARY BLADDER: Decompressed. BONES: No destructive bone lesion. ABDOMINAL WALL: No mass or hernia. ADDITIONAL COMMENTS: N/A                   PFTs:   PFT Results  (Last 3 results in the past 10 years)      None              Assessment:     Patient is a 47-year-old  female with a history of obesity, [place] cirrhosis, fibromyalgia, and type 2 diabetes mellitus who presented to the hospital with abdominal pain/distention and was found to have colonic stool retention and developed septic shock and acute hypoxic respiratory failure. Plan:     1.)  Septic shock  -Unclear initial etiology, certainly a gastrointestinal source seems the most likely at this point.  -Lactate level is rising, CRRT starting as below  -Currently she is on Levophed at 100 mcg/min, Chucho-Synephrine at 200, vasopressin 0.04; will go ahead and start her on an epinephrine drip. Titrate vasopressors for goal MAP greater than 65 mmHg.  -She is already had 2 cardiac arrests, one PEA and the other was asystole.   Briefly went into ventricular tachycardia after her second cardiac arrest, with a pulse, therefore she was given 150 mg IV amiodarone with improvement in her arrhythmia.  -Continue to trend lactate levels every 6 hours  -Overall extremely poor prognosis, maintain ICU level of care    2.)  Acute hypoxic respiratory failure  -Secondary to acute metabolic encephalopathy from multiple electrolyte abnormalities, shock liver, septic shock, constipation/partial bowel obstruction, lactic acidosis, hyperammonemia  -Currently on AC//36/100%/10, attempted maintain sats greater than 90% on the ventilator. Pulse ox does not seem to be reliable, can order frequent ABGs now that she has a right femoral arterial line.  -Continue sodium bicarbonate administration for severe metabolic acidosis, ABG as above. -Chest x-ray showing some mild bibasilar atelectasis but otherwise ET tube appears to be in an appropriate position.  -Patient is on all the appropriate prophylactic measures; no role for tube feeds at this time given severe shock. -Goals of care already discussed in detail with the patient's family, they wish to pursue full code for now.    3.)  Shock liver in the setting of [place] cirrhosis  -Reportedly has significant fatty liver at baseline, there is a question of whether or not she has cirrhosis as well. Abdominal ultrasound pending  -ALT now 732, AST greater than 2000  -Continue to maintain MAP greater than 65 mmHg if possible with aggressive vasopressor support  -Ammonia level 239, very high concern for cerebral edema at this point as well. Patient is not stable for CT head. -Overall very poor prognosis    4.)  Hyperkalemia  -Potassium level normal on admission, but steadily tiffany to 6.0 last night, now up to 8.9 with worsening renal failure  -Patient has already received multiple rounds of sodium bicarb pushes, will give calcium multiple times now and will also give insulin/dextrose  -Hopefully patient will be able to tolerate CRRT, but she is very hemodynamically unstable  -Cruciate assistance from nephrology    5.)  Acute kidney injury  -Creatinine on admission was 1.82, has steadily increased to 4.52 this morning. Associated with severe metabolic acidosis and lactic acidosis.  -Likely worsened by shock liver  -Nephrology following closely, appreciate their assistance.   -Trialysis line placed by hospitalist team this morning, appreciate their assistance. CRRT will be started today, but suspect that patient will not be able to tolerate given hemodynamic instability.  -Continue to monitor BMPs closely every 6 hours. 6.)  Lactic acidosis  -Lactate level was 7.7 yesterday, which had improved to 5.8 yesterday evening and stable at 5.9 this morning with aggressive IV fluids, but has increased to 14.3 later this morning following her CODE BLUE events and worsening of her shock  -Continue vasopressor support in order to maintain MAP greater than 65 mmHg if possible  -CRRT started by nephrology, appreciate their assistance  -Continue to trend lactate levels every 6 hours    7.)  Hypocalcemia  -Total calcium level 6.5 this morning, corrected to 8.4 with hypoalbuminemia. -Given 2 g IV calcium here this morning  -Repeat calcium level slowly improving to 7.5, which corrects above 8.5    8.)  Bowel obstruction  - Of note, her CT of the abdomen/pelvis on admission showed a dilated fluid-filled proximal colon, partial colonic obstruction is not excluded with a high amount of high attenuation stool in the sigmoid colon and rectum. A repeat CT scan of the abdomen/pelvis on 9/20/2022 showed stable colonic and small bowel dilatation likely representing ileus versus partial obstruction, dilated gallbladder with a 9 mm stone in the neck. -NG tube placed on admission to suction, currently getting out coffee-ground material.  Hemoglobin has dropped from admission but relatively stable at 11.2.  -Continue on Protonix 40 mg IV every 12 hours, and off on GI consultation at this time given grim prognosis  -Not a surgical candidate at this time given hemodynamic instability. Dr. Minesh Murrieta following closely, appreciate his assistance.     9.) Marijuana use  -Tested positive for THC via urine drug screen on admission  -Unclear if this contributed to her issues on admission        CODE STATUS: Full Code    Lines/Tubes: Right femoral central line, right IJ dialysis line, peripheral IV x2, right femoral arterial line, NG tube, ET tube, Arredondo urinary catheter    Prophylaxis:  Stress Ulcer Protocol Active: Yes  Deep Vein Thrombosis Protocol Active: Yes, mechanical with SCD's  Nutrition: NPO  Activity: Bedrest      Disposition and Family: Updated Family at bedside  Remain in the ICU  Very grim prognosis    Total time spent with patient: 95 minutes      Radha Berry DO  [facility]  9/21/2022  1:07 PM

## 2022-09-21 NOTE — PROCEDURES
[facility]                  Pulmonary, Critical Care, and Sleep Medicine     Central Line Procedure with Ultrasound Guidance    Indication: Inadequate venous access, need for vasopressors, sepsis protocol    Risks, benefits, alternatives explained and consent obtained. Patient positioned in Trendelenburg. Timeout called with RN and confirmed patient ID, procedure, site, allergy, consent etc.     Central line Bundle:  Full sterile barrier precautions used. 7-Step Sterility Protocol followed. (cap, mask sterile gown, sterile gloves, large sterile sheet, hand hygiene, 2% chlorhexidine for cutaneous antisepsis)  5 mL 1% Lidocaine placed at insertion site. Using ultrasound guidance,   Right femoral cannulated x 1 attempt(s) utilizing the modified Seldinger technique. no difficulty. Position of wire confirmed in vein using US before dilating. Wire was removed. Good blood return. Catheter secured & Biopatch applied. Sterile Tegaderm placed.         Phani Post, DO   9/21/2022 1:06 PM

## 2022-09-21 NOTE — PROGRESS NOTES
The pt's Boyfriend, Sister and her Daughter all divided the pt's belongings between themselves and are taking everything with them. The pt was noted to have a few rings on her fingers, which they were removed.

## 2022-09-21 NOTE — PROGRESS NOTES
CM reviewed chart. Patient has been placed on ventilator, on titrated IV infusions to help support patient blood pressure. Current discharge plan on admission assessment was home self care. Currently patient is not in stable condition to determine discharge plan. Profile on mian setup for possible needs at discharge.      NOK: only friend listed Myaa Zayra [phone]

## 2022-09-21 NOTE — PROGRESS NOTES
Synopsis of the day:   Look at Procedural notes from Dr. Theresa Amaro and Progress/H&P notes from Dr. Castro Pichardo. After assuming patient care in the AM, the patient had been noted to have just been intubated for AMS, Respiratory Distress and Airway Protection. At around 3285 the pt was noted to have a change in her heart rhythm and then noted to have lost her pulse. CPR was initated and ROSC achieved. Dr. Theresa Amaro came to the pt's Bedside and then attempted to start an A-line. Dr. Kait Workman was notified of the pt's current status, lab results and the events that had taken place. Order for CRRT were then placed. A Trialasis HD catheter was then placed and orders for Vasopressin were added. The pt's Boyfriend [facility]) then called and was updated on everything that had taken place to this point. He then indicated \"that he had the pt's Sisters and Daughters phone numbers and that he would contact them\". At around 1115 the pt had another Code Blue called after loosing her pulse. She achieved ROSC after about 8 minutes. Dr. Theresa Amaro then placed an A-line and a CVC line in the right Fem site. The pt's A-line readings were consistent in the low 50-60's SBP and orders for an Epi drip were given. CRRT was then started at 1220 per Dr. Annette Cade orders. The pt was at this time Maxed on all of her Pressores (Levo, Chucho, Vaso, and Epi) and her SBP remained around 50-80. The pt then at around 1500 was noted to have lost her pulse again. The pt's Daughter Sonny Peace was walked in the room and sat at bedside while CPR was taking place. Dr. Castro Pichardo then spoke with the pt's daughter while performing CPR and expressed that it appears to be futile. The pt's daughter then called it and said to stop everything. Dr. Castro Pichardo then pronounced the TOD to be [phone].

## 2022-09-21 NOTE — PROGRESS NOTES
Spiritual Care Assessment/Progress Note  [facility]      NAME: David Alfred      MRN: [id number]  AGE: 59 y.o. SEX: female  Mu-ism Affiliation: No preference   Language: English     9/21/2022     Total Time (in minutes): 34     Spiritual Assessment begun in [facility] 2 CCU through conversation with:         [x]Patient        [] Family    [] Friend(s)        Reason for Consult: Code Blue/99     Spiritual beliefs: (Please include comment if needed)     [] Identifies with a ara tradition:         [] Supported by a ara community:            [x] Claims no spiritual orientation:   Boyfriend and bedside     [] Seeking spiritual identity:                [] Adheres to an individual form of spirituality:           [] Not able to assess:                           Identified resources for coping:      [] Prayer                               [] Music                  [] Guided Imagery     [x] Family/friends                 [] Pet visits     [] Devotional reading                         [] Unknown     [] Other:                                               Interventions offered during this visit: (See comments for more details)    Patient Interventions:  Other (comment) (Ministry of presence)     Family/Friend(s): Bridging, Normalization of emotional/spiritual concerns, Catharsis/review of pertinent events in supportive environment, Life review/legacy     Plan of Care:     [x] Support spiritual and/or cultural needs    [] Support AMD and/or advance care planning process      [] Support grieving process   [] Coordinate Rites and/or Rituals    [] Coordination with community clergy   [] No spiritual needs identified at this time   [] Detailed Plan of Care below (See Comments)  [] Make referral to Music Therapy  [] Make referral to Pet Therapy     [] Make referral to Addiction services  [] Make referral to [facility]  [] Make referral to Spiritual Care Partner  [] No future visits requested        [x] Contact Spiritual Care for further referrals     Comments:  responded to code blue.  provide ministry of presence during code, Corey Quintero arrived he shared with  that patient has a daughter Keke Jain  [phone].  updated chart with daughter names and number. Lucas share there was  no Advance Directive. Lucas shared they shock and unexpected nature of what is happing. Advised nurse to contact [facility] for any further referrals.     [address], [facility]    Please [facility]  in order to get in touch with  for any Spiritual Care Needs   [phone]   OR   Reach out to us on Perfect Serve at [facility] [place], [facility].

## 2022-09-21 NOTE — PROCEDURES
[facility]                  Pulmonary, Critical Care, and Sleep Medicine     Ultrasound Guided Arterial Line Procedure Note    Indication: Need for vasopressors    Risks, benefits, alternatives explained and consent obtained. Time out called. Patient's left leg was positioned and secured. Arterial line Bundle:  Full sterile barrier precautions used. 7-Step Sterility Protocol followed. (cap, mask sterile gown, sterile gloves, large sterile sheet, hand hygiene, 2% chlorhexidine for cutaneous antisepsis)    Using doppler guidance, Left femoral artery was attempted to be cannulated x 5 attempt(s) with marked difficulty. Ultimately, the procedure was abandoned due to a CODE BLUE situation. The patient lost pulse during the procedure.     Claudine Ferguson DO   9/21/2022 1:03 PM

## 2022-09-21 NOTE — PROGRESS NOTES
Progress Note    Patient: Danielle Milan MRN: [id number]  SSN: [id number]    YOB: 1965  Age: 59 y.o. Sex: female      Admit Date: 9/19/2022    LOS: 2 days     Subjective:   Hospital day 2  Patient requiring increased pressor support  Intubated this a.m. electively for respiratory failure    Objective:     Vitals:    09/21/22 0600 09/21/22 0615 09/21/22 0630 09/21/22 0714   BP: (!) 82/39 (!) 54/26 (!) 59/29    Pulse: (!) 104 (!) 103 96 91   Resp: 23 25 22 27   Temp:       SpO2:    98%   Weight:       Height:            Intake and Output:  Current Shift: No intake/output data recorded. Last three shifts: 09/19 1901 - 09/21 0700  In: -   Out: 1050     Review of Systems:  ROS     Physical Exam:   Abdomen is distended, soft, no involuntary guarding    Lab/Data Review:  Recent Results (from the past 24 hour(s))   CBC WITH AUTOMATED DIFF    Collection Time: 09/20/22  7:58 AM   Result Value Ref Range    WBC 7.1 3.6 - 11.0 K/uL    RBC 5.98 (H) 3.80 - 5.20 M/uL    HGB 18.2 (H) 11.5 - 16.0 g/dL    HCT 53.5 (H) 35.0 - 47.0 %    MCV 89.5 80.0 - 99.0 FL    MCH 30.4 26.0 - 34.0 PG    MCHC 34.0 30.0 - 36.5 g/dL    RDW 13.8 11.5 - 14.5 %    PLATELET 935 (L) 179 - 400 K/uL    MPV 10.9 8.9 - 12.9 FL    NRBC 0.0 0.0  WBC    ABSOLUTE NRBC 0.00 0.00 - 0.01 K/uL    NEUTROPHILS 45 32 - 75 %    BAND NEUTROPHILS 10 (H) 0 - 6 %    LYMPHOCYTES 19 12 - 49 %    MONOCYTES 3 (L) 5 - 13 %    EOSINOPHILS 0 0 - 7 %    BASOPHILS 0 0 - 1 %    METAMYELOCYTES 20 (H) 0 %    MYELOCYTES 3 (H) 0 %    IMMATURE GRANULOCYTES 0 %    ABS. NEUTROPHILS 3.9 1.8 - 8.0 K/UL    ABS. LYMPHOCYTES 1.3 0.8 - 3.5 K/UL    ABS. MONOCYTES 0.2 0.0 - 1.0 K/UL    ABS. EOSINOPHILS 0.0 0.0 - 0.4 K/UL    ABS. BASOPHILS 0.0 0.0 - 0.1 K/UL    ABS. IMM.  GRANS. 0.0 K/UL    DF Manual      RBC COMMENTS [place] cells  1+       METABOLIC PANEL, COMPREHENSIVE    Collection Time: 09/20/22  7:58 AM   Result Value Ref Range    Sodium 136 136 - 145 mmol/L    Potassium 4.6 3.5 - 5.1 mmol/L    Chloride 103 97 - 108 mmol/L    CO2 14 (LL) 21 - 32 mmol/L    Anion gap 19 (H) 5 - 15 mmol/L    Glucose 315 (H) 65 - 100 mg/dL    BUN 34 (H) 6 - 20 mg/dL    Creatinine 2.75 (H) 0.55 - 1.02 mg/dL    BUN/Creatinine ratio 12 12 - 20      GFR est AA 21 (L) >60 ml/min/1.73m2    GFR est non-AA 18 (L) >60 ml/min/1.73m2    Calcium 9.8 8.5 - 10.1 mg/dL    Bilirubin, total 1.0 0.2 - 1.0 mg/dL    AST (SGOT) 96 (H) 15 - 37 U/L    ALT (SGPT) 71 12 - 78 U/L    Alk.  phosphatase 130 (H) 45 - 117 U/L    Protein, total 5.6 (L) 6.4 - 8.2 g/dL    Albumin 3.0 (L) 3.5 - 5.0 g/dL    Globulin 2.6 2.0 - 4.0 g/dL    A-G Ratio 1.2 1.1 - 2.2     GLUCOSE, POC    Collection Time: 09/20/22  8:07 AM   Result Value Ref Range    Glucose (POC) 392 (H) 65 - 100 mg/dL    Performed by Felix Matias    GLUCOSE, POC    Collection Time: 09/20/22 10:10 AM   Result Value Ref Range    Glucose (POC) 266 (H) 65 - 100 mg/dL    Performed by LALIT GREGORIO    HGB & HCT    Collection Time: 09/20/22 12:00 PM   Result Value Ref Range    HGB 16.8 (H) 11.5 - 16.0 g/dL    HCT 50.1 (H) 35.0 - 47.0 %   AMMONIA    Collection Time: 09/20/22 12:30 PM   Result Value Ref Range    Ammonia, plasma 56 (H) <35 umol/L   METABOLIC PANEL, BASIC    Collection Time: 09/20/22 12:30 PM   Result Value Ref Range    Sodium 135 (L) 136 - 145 mmol/L    Potassium 5.2 (H) 3.5 - 5.1 mmol/L    Chloride 103 97 - 108 mmol/L    CO2 18 (L) 21 - 32 mmol/L    Anion gap 14 5 - 15 mmol/L    Glucose 227 (H) 65 - 100 mg/dL    BUN 40 (H) 6 - 20 mg/dL    Creatinine 2.87 (H) 0.55 - 1.02 mg/dL    BUN/Creatinine ratio 14 12 - 20      GFR est AA 20 (L) >60 ml/min/1.73m2    GFR est non-AA 17 (L) >60 ml/min/1.73m2    Calcium 8.7 8.5 - 10.1 mg/dL   LACTIC ACID    Collection Time: 09/20/22 12:30 PM   Result Value Ref Range    Lactic acid 7.7 (HH) 0.4 - 2.0 mmol/L   GLUCOSE, POC    Collection Time: 09/20/22  2:17 PM   Result Value Ref Range    Glucose (POC) 174 (H) 65 - 100 mg/dL    Performed by Yon Essence    GLUCOSE, POC    Collection Time: 09/20/22  6:00 PM   Result Value Ref Range    Glucose (POC) 120 (H) 65 - 100 mg/dL    Performed by Rosalba Moeller    RENAL FUNCTION PANEL    Collection Time: 09/20/22  8:33 PM   Result Value Ref Range    Sodium 139 136 - 145 mmol/L    Potassium 5.2 (H) 3.5 - 5.1 mmol/L    Chloride 107 97 - 108 mmol/L    CO2 18 (L) 21 - 32 mmol/L    Anion gap 14 5 - 15 mmol/L    Glucose 98 65 - 100 mg/dL    BUN 52 (H) 6 - 20 mg/dL    Creatinine 3.32 (H) 0.55 - 1.02 mg/dL    BUN/Creatinine ratio 16 12 - 20      GFR est AA 17 (L) >60 ml/min/1.73m2    GFR est non-AA 14 (L) >60 ml/min/1.73m2    Calcium 7.6 (L) 8.5 - 10.1 mg/dL    Phosphorus 4.4 2.6 - 4.7 mg/dL    Albumin 2.1 (L) 3.5 - 5.0 g/dL   METABOLIC PANEL, COMPREHENSIVE    Collection Time: 09/20/22  8:33 PM   Result Value Ref Range    Sodium 139 136 - 145 mmol/L    Potassium 5.2 (H) 3.5 - 5.1 mmol/L    Chloride 107 97 - 108 mmol/L    CO2 19 (L) 21 - 32 mmol/L    Anion gap 13 5 - 15 mmol/L    Glucose 96 65 - 100 mg/dL    BUN 53 (H) 6 - 20 mg/dL    Creatinine 3.33 (H) 0.55 - 1.02 mg/dL    BUN/Creatinine ratio 16 12 - 20      GFR est AA 17 (L) >60 ml/min/1.73m2    GFR est non-AA 14 (L) >60 ml/min/1.73m2    Calcium 7.3 (L) 8.5 - 10.1 mg/dL    Bilirubin, total 1.4 (H) 0.2 - 1.0 mg/dL    AST (SGOT) 545 (H) 15 - 37 U/L    ALT (SGPT) 209 (H) 12 - 78 U/L    Alk.  phosphatase 199 (H) 45 - 117 U/L    Protein, total 4.1 (L) 6.4 - 8.2 g/dL    Albumin 2.1 (L) 3.5 - 5.0 g/dL    Globulin 2.0 2.0 - 4.0 g/dL    A-G Ratio 1.1 1.1 - 2.2     LACTIC ACID    Collection Time: 09/20/22  8:33 PM   Result Value Ref Range    Lactic acid 5.8 (HH) 0.4 - 2.0 mmol/L   CBC WITH AUTOMATED DIFF    Collection Time: 09/20/22  8:33 PM   Result Value Ref Range    WBC 2.9 (L) 3.6 - 11.0 K/uL    RBC 4.83 3.80 - 5.20 M/uL    HGB 14.6 11.5 - 16.0 g/dL    HCT 42.7 35.0 - 47.0 %    MCV 88.4 80.0 - 99.0 FL    MCH 30.2 26.0 - 34.0 PG    MCHC 34.2 30.0 - 36.5 g/dL    RDW 13.6 11.5 - 14.5 %    PLATELET 87 (L) 034 - 400 K/uL    MPV 11.0 8.9 - 12.9 FL    NRBC 0.0 0.0  WBC    ABSOLUTE NRBC 0.00 0.00 - 0.01 K/uL    NEUTROPHILS 66 32 - 75 %    LYMPHOCYTES 24 12 - 49 %    MONOCYTES 10 5 - 13 %    EOSINOPHILS 0 0 - 7 %    BASOPHILS 0 0 - 1 %    IMMATURE GRANULOCYTES 0 %    ABS. NEUTROPHILS 1.9 1.8 - 8.0 K/UL    ABS. LYMPHOCYTES 0.7 (L) 0.8 - 3.5 K/UL    ABS. MONOCYTES 0.3 0.0 - 1.0 K/UL    ABS. EOSINOPHILS 0.0 0.0 - 0.4 K/UL    ABS. BASOPHILS 0.0 0.0 - 0.1 K/UL    ABS. IMM. GRANS. 0.0 K/UL    DF Manual      RBC COMMENTS Anisocytosis  2+       METABOLIC PANEL, COMPREHENSIVE    Collection Time: 09/20/22 11:52 PM   Result Value Ref Range    Sodium 138 136 - 145 mmol/L    Potassium 6.0 (H) 3.5 - 5.1 mmol/L    Chloride 107 97 - 108 mmol/L    CO2 19 (L) 21 - 32 mmol/L    Anion gap 12 5 - 15 mmol/L    Glucose 65 65 - 100 mg/dL    BUN 57 (H) 6 - 20 mg/dL    Creatinine 3.65 (H) 0.55 - 1.02 mg/dL    BUN/Creatinine ratio 16 12 - 20      GFR est AA 15 (L) >60 ml/min/1.73m2    GFR est non-AA 13 (L) >60 ml/min/1.73m2    Calcium 7.4 (L) 8.5 - 10.1 mg/dL    Bilirubin, total 1.6 (H) 0.2 - 1.0 mg/dL    AST (SGOT) 778 (H) 15 - 37 U/L    ALT (SGPT) 271 (H) 12 - 78 U/L    Alk.  phosphatase 239 (H) 45 - 117 U/L    Protein, total 4.2 (L) 6.4 - 8.2 g/dL    Albumin 2.1 (L) 3.5 - 5.0 g/dL    Globulin 2.1 2.0 - 4.0 g/dL    A-G Ratio 1.0 (L) 1.1 - 2.2     CBC W/O DIFF    Collection Time: 09/20/22 11:52 PM   Result Value Ref Range    WBC 3.7 3.6 - 11.0 K/uL    RBC 4.78 3.80 - 5.20 M/uL    HGB 14.3 11.5 - 16.0 g/dL    HCT 42.7 35.0 - 47.0 %    MCV 89.3 80.0 - 99.0 FL    MCH 29.9 26.0 - 34.0 PG    MCHC 33.5 30.0 - 36.5 g/dL    RDW 13.7 11.5 - 14.5 %    PLATELET 76 (L) 118 - 400 K/uL    MPV 11.5 8.9 - 12.9 FL    NRBC 0.0 0.0  WBC    ABSOLUTE NRBC 0.00 0.00 - 0.01 K/uL   GLUCOSE, POC    Collection Time: 09/21/22 12:58 AM   Result Value Ref Range    Glucose (POC) 30 (LL) 65 - 100 mg/dL    Performed by [facility]    GLUCOSE, POC    Collection Time: 09/21/22  1:00 AM   Result Value Ref Range    Glucose (POC) 44 (LL) 65 - 100 mg/dL    Performed by [facility]    BLOOD GAS, ARTERIAL    Collection Time: 09/21/22  1:12 AM   Result Value Ref Range    pH 7.10 (LL) 7.35 - 7.45      PCO2 46 (H) 35 - 45 mmHg    PO2 98 80 - 100 mmHg    O2 SATURATION 95 95 - 99 %    BICARBONATE 14 (L) 22 - 26 mmol/L    BASE DEFICIT 15.0 mmol/L    O2 METHOD BiPAP      FIO2 50 %    Tidal volume 550.0      SET RATE 18      PEEP/CPAP 5.0      Sample source Arterial      SITE Left Brachial      ROMA'S TEST NOT APPLICABLE      Carboxy-Hgb 1.2 1 - 2 %    Methemoglobin 0.0 0 - 1.4 %    Oxyhemoglobin 93.9 (L) 95 - 99 %    Performed by Elvia Saldana     Critical value read back Called to  [facility] on 09/21/2022 at 01:17     TEMPERATURE 100.3     GLUCOSE, POC    Collection Time: 09/21/22  1:21 AM   Result Value Ref Range    Glucose (POC) 84 65 - 100 mg/dL    Performed by [facility]    LACTIC ACID    Collection Time: 09/21/22  1:25 AM   Result Value Ref Range    Lactic acid 5.9 (HH) 0.4 - 2.0 mmol/L   BLOOD GAS, ARTERIAL    Collection Time: 09/21/22  4:28 AM   Result Value Ref Range    pH 7.15 (LL) 7.35 - 7.45      PCO2 43 35 - 45 mmHg    PO2 84 80 - 100 mmHg    O2 SATURATION 95 95 - 99 %    BICARBONATE 15 (L) 22 - 26 mmol/L    BASE DEFICIT 13.9 mmol/L    O2 METHOD BiPAP      FIO2 50 %    Tidal volume 550.0      SET RATE 18      PEEP/CPAP 5.0      Sample source Arterial      SITE Right Radial      ROMA'S TEST NOT APPLICABLE      Carboxy-Hgb 1.7 1 - 2 %    Methemoglobin 0.3 0 - 1.4 %    Oxyhemoglobin 93.1 (L) 95 - 99 %    Performed by Elvia Saldana     Critical value read back Called to  [facility] on 09/21/2022 at 04:31     TEMPERATURE 100.3     GLUCOSE, POC    Collection Time: 09/21/22  4:35 AM   Result Value Ref Range    Glucose (POC) 46 (LL) 65 - 100 mg/dL    Performed by Mary Parikh, POC    Collection Time: 09/21/22  4:36 AM   Result Value Ref Range    Glucose (POC) 34 (LL) 65 - 100 mg/dL    Performed by Carmel Glasgow    GLUCOSE, POC    Collection Time: 09/21/22  5:40 AM   Result Value Ref Range    Glucose (POC) 139 (H) 65 - 100 mg/dL    Performed by Carmel Glasgow    BLOOD GAS, ARTERIAL    Collection Time: 09/21/22  5:57 AM   Result Value Ref Range    pH 7.06 (LL) 7.35 - 7.45      PCO2 61 (H) 35 - 45 mmHg    PO2 195 (H) 80 - 100 mmHg    O2 SATURATION 99 95 - 99 %    BICARBONATE 16 (L) 22 - 26 mmol/L    BASE DEFICIT 13.4 mmol/L    O2 METHOD BiPAP      FIO2 100 %    Tidal volume 550.0      SET RATE 18      PEEP/CPAP 5.0      Sample source Arterial      SITE Left Brachial      ROMA'S TEST NOT APPLICABLE      Carboxy-Hgb 1.0 1 - 2 %    Methemoglobin 0.0 0 - 1.4 %    Oxyhemoglobin 97.7 95 - 99 %    Performed by Anastasia Manuel     Critical value read back       Called to Ofelia Gaytan on 09/21/2022 at 06:00    TEMPERATURE 102.7     GLUCOSE, POC    Collection Time: 09/21/22  7:15 AM   Result Value Ref Range    Glucose (POC) 35 (LL) 65 - 100 mg/dL    Performed by [address] [place] [address] Dr Love, POC    Collection Time: 09/21/22  7:17 AM   Result Value Ref Range    Glucose (POC) 79 65 - 100 mg/dL    Performed by Brian STUART        Assessment:     Active Problems:    Bowel obstruction ([facility] (9/19/2022)      UTI (urinary tract infection) (9/21/2022)      Acute kidney failure ([facility] (9/21/2022)      Fatty liver (9/21/2022)      Hypercarbia (9/21/2022)      Hypoglycemia (9/21/2022)      Lactic acidosis (9/21/2022)      Obesity (9/21/2022)        Plan:   Patient continues to deteriorate clinically. Is now on a bicarb drip. Was intubated electively for a blood gas of 7.06 61 1 9516 with a base deficit of 13. Patient was on a BiPAP prior to intubation. Source of sepsis is unclear. Patient also has increasing hepatic dysfunction with AST elevated 778  alkaline phosphatase 239 and bilirubin up to 1.6.   She has had 2 CT scans of the abdomen and pelvis both of which demonstrated mild distention of small bowel and dilated fluid-filled colon. She does have distal stool. This is consistent with her history of excessive Imodium intake. She has had 0 urine output over the past 24 hours. Her last creatinine is up to 3.65 with a BUN of 57. Nephrology is following.    Continue broad-spectrum antibiotics  Follow-up a.m. labs  Continue supportive measures  Patient remains in critical condition with poor prognosis    Signed By: Brandon Arroyo MD     September 21, 2022

## 2022-09-21 NOTE — PROGRESS NOTES
DR. Ramsay Lands AT BEDSIDE TO EVALUATE PATIENT. NEW ORDERS OBTAINED. WILL CONTINUE TO CLOSELY MONITOR.

## 2022-09-21 NOTE — PROGRESS NOTES
Attempted to call the friend listed on the demographics sheet, Patria Phillips at [phone]. No answer, left message to call back.

## 2022-09-21 NOTE — PROGRESS NOTES
I spoke with, Ligia Polo, from [place]. He informed me that the pt is not a candidate for Organ donation, but that [facility] would be in touch about possible donation.

## 2022-09-21 NOTE — PROGRESS NOTES
DR. Kathryn Pina NOTIFIED OF PT.'S WORSENING CONDITION - PT. NOW MINIMALLY RESPONSIVE TO NOXIOUS STIMULI. HYPOTENSION CONTINUES DESPITE CONSISTENT UPWARD TITRATION OF IV LEVOPHED GTT. ORDER OBTAINED TO ADMINISTER 1000ML NS BOLUS. MD REQUESTED THAT RN HAVE IN-HOUSE HOSPITALIST COME TO BEDSIDE TO EVALUATE PATIENT. ORDERS REPEATED AND VERIFIED. WILL CONTINUE TO CLOSELY MONITOR.

## 2022-09-21 NOTE — ANESTHESIA PREPROCEDURE EVALUATION
Relevant Problems   GASTROINTESTINAL   (+) Fatty liver      RENAL FAILURE   (+) Acute kidney failure (HCC)      ENDOCRINE   (+) Obesity       Anesthetic History     Increased risk of difficult airway          Review of Systems / Medical History  Nursing notes reviewed and pertinent labs reviewed    Pulmonary          Shortness of breath         Neuro/Psych             Comments: Acute encephalopathy. Cardiovascular            Dysrhythmias       Exercise tolerance: <4 METS: Sepsis     GI/Hepatic/Renal               Comments: Bowel obstruction Endo/Other             Other Findings              Physical Exam    Airway  Mallampati: III  TM Distance: < 4 cm  Neck ROM: decreased range of motion   Mouth opening: Diminished (comment)     Cardiovascular    Rhythm: irregular  Rate: abnormal         Dental      Comments: Unable to assess.    Pulmonary    Rhonchi:bilateral  Decreased breath sounds: bibasilar  Wheezes:bibasilar         Abdominal    Distended     Other Findings            Anesthetic Plan    ASA: 3, emergent  Anesthesia type: total IV anesthesia

## 2022-09-21 NOTE — CARDIO/PULMONARY
CODE BLUE NOTE:    In the middle of left femoral arterial line placement, the patient unfortunately lost her pulse. She became bradycardic and eventually went asystole. Chest compressions were started, the backboard was already present from her prior CODE BLUE event earlier on in the day. She was given in total to milligrams IV epinephrine and 1 amp of sodium bicarb. ROSC was obtained after 7 minutes and her initial rhythm was ventricular tachycardia with a pulse. We then gave 150 mg IV amiodarone and 1 g IV calcium chloride and she then transitioned into sinus tachycardia and eventually normal sinus rhythm. I was able to reach the patient's daughter, Melissa Dejesus at [phone], and I discussed her mother's case in detail with her over the phone. I had a goals of care discussion with her as well and she would like to continue full CODE STATUS at this time. She is coming from [place] today and should be here by the afternoon, her cousin [place] is also on her way from [place], and her boyfriend Danny Newsome) is at the bedside at this time. I have updated to wean as well. I discussed the case in detail with the hospitalist team (Dr. Rae Granados), Nephrology (Dr. Key Caceres), and the primary surgical team (Dr. Courtney Magana).       30 minutes critical care time      Landon Barnett DO  [facility] ([facility])

## 2022-09-21 NOTE — PROGRESS NOTES
Indra Ward called back and stated that the patient was ruled out for organ donation due to MODS. Asked us to call for TOD.

## 2022-09-21 NOTE — PROGRESS NOTES
was present for code blue, upon request  brought family back for the code and was bedside with daughter and son in law.  support to family as they shared stories and played music.  provide space for family to spend time one with their loved one. Advised nurse to contact [facility] for any further referrals.     [address], [facility]    Please [facility]  in order to get in touch with  for any Spiritual Care Needs   [phone]   OR   Reach out to us on Perfect Serve at [facility] OF [employer].

## 2022-09-21 NOTE — PROGRESS NOTES
DR. Yaya Calloway, IN-HOUSE HOSPITALIST, NOTIFIED OF DR. CHAVEZ'S REQUEST FOR HOSPITALIST TO COME TO BEDSIDE TO EVALUATE PATIENT - MD AGREED. WILL CONTINUE TO CLOSELY MONITOR.

## 2022-09-21 NOTE — PROCEDURES
[facility]                  Pulmonary, Critical Care, and Sleep Medicine     Ultrasound Guided Arterial Line Procedure Note    Indication: Need for vasopressors    Risks, benefits, alternatives explained and consent obtained. Time out called. Patient's right leg was positioned and secured. Arterial line Bundle:  Full sterile barrier precautions used. 7-Step Sterility Protocol followed. (cap, mask sterile gown, sterile gloves, large sterile sheet, hand hygiene, 2% chlorhexidine for cutaneous antisepsis)    Using doppler guidance, Right femoral artery cannulated x 1 attempt(s) with no difficulty. Good blood return and wave form. Opening blood pressure was 55/30 mm Hg. Catheter secured & Biopatch applied. Sterile Tegaderm placed. No hematoma. Distal perfusion good. Ok to use line. Watch distal foot for circulation.     Phani Post, DO   9/21/2022 1:05 PM

## 2022-09-21 NOTE — PROGRESS NOTES
inputted sister Maximus Has [phone] in to chart.  handed off phone number to Doctor Dandy Sales. Advised nurse to contact [facility] for any further referrals.     [address] [place] [address], [facility]    Please [facility]  in order to get in touch with  for any Spiritual Care Needs   [phone]   OR   Reach out to us on Perfect Serve at [facility] [employer] [facility].

## 2022-09-21 NOTE — PROGRESS NOTES
Called listed contact to obtain consent for ordered central line, no answer, left VM requesting a call back

## 2022-09-21 NOTE — PROGRESS NOTES
Spiritual Care Assessment/Progress Note  [facility]      NAME: Danielle Milan      MRN: [id number]  AGE: 59 y.o. SEX: female  Mormon Affiliation: No preference   Language: English     9/21/2022     Total Time (in minutes): 20     Spiritual Assessment begun in [facility] 2 CCU through conversation with:         [x]Patient        [] Family    [] Friend(s)        Reason for Consult: Code Blue/Gabriel     Spiritual beliefs: (Please include comment if needed)     [] Identifies with a ara tradition:         [] Supported by a ara community:            [] Claims no spiritual orientation:           [] Seeking spiritual identity:                [] Adheres to an individual form of spirituality:           [x] Not able to assess:   Code blue                    Identified resources for coping:      [] Prayer                               [] Music                  [] Guided Imagery     [] Family/friends                 [] Pet visits     [] Devotional reading                         [x] Unknown     [] Other:                                               Interventions offered during this visit: (See comments for more details)    Patient Interventions: Initial visit, Other (comment) (Code Blue)           Plan of Care:     [x] Support spiritual and/or cultural needs    [] Support AMD and/or advance care planning process      [] Support grieving process   [] Coordinate Rites and/or Rituals    [] Coordination with community clergy   [] No spiritual needs identified at this time   [] Detailed Plan of Care below (See Comments)  [] Make referral to Music Therapy  [] Make referral to Pet Therapy     [] Make referral to Addiction services  [] Make referral to [facility]  [] Make referral to Spiritual Care Partner  [] No future visits requested        [x] Contact Spiritual Care for further referrals     Comments:  responded to Code blue to .  offered ministry of presence.  Staff shared they were unable to get in touch with family. There is no Advance  care planning documents naming friend.  advised staff. Advised nurse to contact [facility] for any further referrals.     [address] [facility] [address], [facility]    Please [facility]  in order to get in touch with  for any Spiritual Care Needs   [phone]   OR   Reach out to us on Perfect Serve at [facility] [employer] [facility].

## 2022-09-21 NOTE — PROGRESS NOTES
Progress Note  Date:2022       Room:[facility]  Patient Name:Jessie Luna     YOB: 1965     Age:57 y.o. Subjective    Subjective     Recurrent major depressive disorder, severe urge incontinence, fatty liver disease, who presented to the emergency room on  with complaints of severe abdominal pain and constipation. Patient had CT of the abdomen which showed bowel distention consistent with ileus. Patient reported using Imodium to help control her diarrhea. During the course of hospitalization, patient became increasingly acidotic with pH 7.10/PCO2 46/HCO3 14. Patient was initially started on BiPAP. However subsequently had to be intubated due to inability to protect airway. Patient's urine output dropped and she became severely oliguric. Her labs also showed marked hyperkalemia this morning up to 7.3. Anion gap corrected was elevated. Creatinine uptrended  This morning patient had a cardiac arrest.    Patient is being followed by surgery. Further interventions are limited due to clinical detoriation      Physical exam  General: Intubated, unresponsive  Neurological: Dilated pupils, absent doll reflex  CVS: Regular rate and rhythm  Respiratory: On vent  Abdomen: Distended, tense, no guarding appreciated  Extremities: No rash,    Review of Systems  Objective         Vitals Last 24 Hours:  TEMPERATURE:  Temp  Av.4 °F (37.4 °C)  Min: 97.7 °F (36.5 °C)  Max: 102.7 °F (39.3 °C)  RESPIRATIONS RANGE: Resp  Av.9  Min: 20  Max: 43  PULSE OXIMETRY RANGE: SpO2  Av.2 %  Min: 58 %  Max: 100 %  PULSE RANGE: Pulse  Av.2  Min: 86  Max: 144  BLOOD PRESSURE RANGE: Systolic (03CVB), DEF:59 , Min:53 , EBZ:792   ; Diastolic (10OWI), [place]:09, Min:26, Max:121    I/O (24Hr):     Intake/Output Summary (Last 24 hours) at 2022 0956  Last data filed at 2022 1849  Gross per 24 hour   Intake --   Output 1050 ml   Net -1050 ml     Objective:  Vital signs: (most recent): Blood pressure (!) 59/29, pulse (!) 144, temperature 99.6 °F (37.6 °C), resp. rate (!) 31, height 5' 9\" (1.753 m), weight 98.9 kg (218 lb 0.6 oz), SpO2 91 %. Labs/Imaging/Diagnostics    Labs:  CBC:  Recent Labs     09/20/22 2352 09/20/22 2033 09/20/22  1200 09/20/22  0758   WBC 3.7 2.9*  --  7.1   RBC 4.78 4.83  --  5.98*   HGB 14.3 14.6 16.8* 18.2*   HCT 42.7 42.7 50.1* 53.5*   MCV 89.3 88.4  --  89.5   RDW 13.7 13.6  --  13.8   PLT 76* 87*  --  142*     CHEMISTRIES:  Recent Labs     09/21/22  0545 09/20/22 2352 09/20/22 2033    138 139  139   K 7.3* 6.0* 5.2*  5.2*    107 107  107   CO2 21 19* 18*  19*   BUN 59* 57* 52*  53*   CA 6.5* 7.4* 7.6*  7.3*   PHOS 8.2*  --  4.4   PT/INR:No results for input(s): INR, INREXT in the last 72 hours. No lab exists for component: PROTIME  APTT:No results for input(s): APTT in the last 72 hours. LIVER PROFILE:  Recent Labs     09/20/22 2352 09/20/22 2033 09/20/22  0758   * 545* 96*   * 209* 71     Lab Results   Component Value Date/Time    ALT (SGPT) 271 (H) 09/20/2022 11:52 PM    AST (SGOT) 778 (H) 09/20/2022 11:52 PM    Alk. phosphatase 239 (H) 09/20/2022 11:52 PM    Bilirubin, total 1.6 (H) 09/20/2022 11:52 PM       Imaging Last 24 Hours:  XR ABD (KUB)    Result Date: 9/21/2022  INDICATION: obstruction EXAMINATION:  ABDOMEN KUB COMPARISON: 9/20/2022 FINDINGS: AP radiograph of the abdomen demonstrates persistent dilated small bowel loops in the central abdomen. Nasogastric tube tip overlies the gastric fundus. Large amount of stool in the rectum. No abnormal calcifications are identified. The osseous structures are normal.     Persistent dilated small bowel loops. Nasogastric tube tip over the gastric fundus. Large amount of colonic stool. CT ABD PELV WO CONT    Result Date: 9/20/2022  EXAM: CT ABD PELV WO CONT INDICATION: worsening abdomen COMPARISON: CT abdomen pelvis from 9/19/2022 IV CONTRAST: None.  ORAL CONTRAST: None TECHNIQUE: Thin axial images were obtained through the abdomen and pelvis. Coronal and sagittal reformats were generated. CT dose reduction was achieved through use of a standardized protocol tailored for this examination and automatic exposure control for dose modulation. The absence of intravenous contrast material reduces the sensitivity for evaluation of the vasculature and solid organs. FINDINGS: LOWER THORAX: Trace bilateral pleural effusions. LIVER: No mass. BILIARY TREE: Distended gallbladder with 9 mm stone in the neck. CBD is not dilated. SPLEEN: within normal limits. PANCREAS: No focal abnormality. ADRENALS: Unremarkable. KIDNEYS/URETERS: No calculus or hydronephrosis. STOMACH: Unremarkable. SMALL BOWEL: Mild distention of small bowel with air-fluid levels, not significantly changed. COLON: Dilated fluid-filled:, Not significant changed from prior study. Oral contrast seen within the rectum. APPENDIX: Unremarkable. PERITONEUM: Mild ascites. No pneumoperitoneum. RETROPERITONEUM: No lymphadenopathy or aortic aneurysm. REPRODUCTIVE ORGANS: Uterus is unremarkable. No adnexal masses. URINARY BLADDER: No mass or calculus. BONES: No destructive bone lesion. ABDOMINAL WALL: No mass or hernia. ADDITIONAL COMMENTS: N/A     1.  Stable colonic and small bowel dilatation. Findings could represent ileus versus partial obstruction. Further radiographic follow-up may be of benefit. 2.  Dilated gallbladder with 9 mm stone in the neck. Consider ultrasound evaluation if there is concern for acute cholecystitis. 3.  Additional findings as above. XR CHEST PORT    Result Date: 9/21/2022  INDICATION: PAIN DOWNTIME ORDER EXAMINATION:  AP CHEST, PORTABLE COMPARISON: 9/20/2022 FINDINGS: Single AP portable view of the chest demonstrates unchanged nasogastric tube. The cardiomediastinal silhouette is unchanged. There is no new airspace disease. No significant change.     XR CHEST PORT    Result Date: 9/21/2022  EXAM:  XR CHEST PORT INDICATION: intubation COMPARISON: One day prior. TECHNIQUE: Single frontal view of the chest. FINDINGS: Lungs are hypoventilatory with mild bibasilar opacities most likely representing atelectasis. Endotracheal tube terminates 4.5 cm from the renata. Enteric tube terminates below the level diaphragm outside the field of view of the study. No definite evidence of an effusion. No visualized pneumothorax. 1.  Hypoventilatory lungs with mild bibasilar opacities most suggestive of atelectasis. XR CHEST PORT    Result Date: 9/20/2022  INDICATION: . NGT placement Additional history: COMPARISON: None. LIMITATIONS: Portable technique. [facility] FINDINGS: Single frontal view of the chest. . Lines/tubes/surgical: A gastric tube traverses the expected course of the esophagus, enters the upper abdomen and terminates below the image and appears to terminate below the left hemidiaphragm. Heart/mediastinum: Unremarkable. Lungs/pleura: Low lung volumes without definite acute process. No visualized pleural effusion or pneumothorax. Additional Comments: None. .    1. Gastric tube likely terminates in the stomach.       Assessment//Plan   Active Problems:    Bowel obstruction (HCC) (9/19/2022)      UTI (urinary tract infection) (9/21/2022)      Acute kidney failure ([address] (9/21/2022)      Fatty liver (9/21/2022)      Hypercarbia (9/21/2022)      Hypoglycemia (9/21/2022)      Lactic acidosis (9/21/2022)      Obesity (9/21/2022)      Assessment:   (SEVERE OLIGURIC ACUTE KIDNEY INJURY STAGE III  ACUTE ABDOMEN -SEPSIS VS. BOWEL OBSTRUCTION   ANION GAP METABOLIC ACIDOSIS  HYPERKALEMIA  CARDIOGENIC SHOCK IN THE SETTING OF CARDIAC ARREST  ANOXIC BRAIN INJURY      Plan:   - CVVHD initiation today following confirmation of trialysis catheter placement  - Use of dialysate rate at 2.5L/hr, with BFR at 250ml/hr.   - Daily phos measurement  - Serial labs q6 hour initially  - Assess comfort care options given poor neurologic recovery ).      Electronically signed by Patria Banks MD on 9/21/2022 at 9:56 AM

## 2022-09-21 NOTE — PROGRESS NOTES
Vancomycin Dosing Consult  Orlando Cline is a 59 y.o. female with sepsis/suspected intra-abdominal infection. Pharmacy was consulted by Dr. Neda Macias to dose and monitor vancomycin. Today is day 1.     Antibiotic regimen: Vancomycin + meropenem + fluconazole    Temp (24hrs), Av.4 °F (37.4 °C), Min:97.7 °F (36.5 °C), Max:102.7 °F (39.3 °C)    Recent Labs     22  0930 22  0545 22  2352 223   WBC 3.9  --  3.7 2.9*   CREA  --  3.95* 3.65* 3.32*  3.33*   BUN  --  59* 57* 52*  53*       Est CrCl: 19 mL/min  UOP: anuric  Concomitant nephrotoxic drugs: Vasopressors    Cultures:    blood - pending    MRSA Swab: N/A    Target range: re-dose for level < 15    Assessment/Plan:   Patient s/p cardiac arrest, appears to be in septic shock with anuric MICHELA   Vancomycin 2000 mg x 1   Level ordered for  1200  Antimicrobial stop date TBD     Russella Lennox, PharmD, BCPS, BCCCP  Clinical Pharmacist   (available on [employer])

## 2022-09-21 NOTE — PROGRESS NOTES
DR. Grayson Salgado NOTIFIED OF PT.'S LAB RESULTS - CHEMISTRY AND HEMATOLOGY. MD ALSO INFORMED OF ABDOMINAL CT SCAN RESULTS. NO NEW ORDERS OBTAINED AT PRESENT. WILL CONTINUE TO CLOSELY MONITOR.

## 2022-09-21 NOTE — PROGRESS NOTES
I spoke with the pt's Boyfriend, Yahaira Jaramillo, and updated him on the pt's current status. He informed me that \"the pt has a Sister and a daughter who live [place]of[place]State, and that they are somewhat estranged. \" He did inform me that Mulu Llanos has their phone numbers however, and that he would call them to inform them of what has happened so far. \"

## 2022-09-21 NOTE — ANESTHESIA PROCEDURE NOTES
Emergent Intubation  Performed by: Nora Leigh CRNA  Authorized by: Nora Leigh CRNA     Emergent Intubation:   Location:  ICU  Date/Time:  9/21/2022 6:30 AM  Indications:  Respiratory failure  Spontaneous Ventilation: present    Level of Consciousness: unresponsive  Preoxygenated: Yes      Airway Documentation:   Airway:  ETT - Cuffed  Technique:  Direct laryngoscopy  Insertion Site:  Oral  Blade Type:  Jack  Blade Size:  2  ETT size (mm):  7.5  ETT Line Rohit:  Lips  ETT Insertion depth (cm):  22  Placement verified by: auscultation and EtCO2    Attempts:  1  Difficult airway: Yes    Receeding mandibulae. MP III.

## 2022-09-21 NOTE — PROCEDURES
[facility]                  Pulmonary, Critical Care, and Sleep Medicine     Ultrasound Guided Arterial Line Procedure Note    Indication: Need for vasopressors, CRRT, shock    Risks, benefits, alternatives explained and consent obtained. Time out called. Patient's left wrist was positioned and secured. Collateral circulation confirmed with Seun test.     Arterial line Bundle:  Full sterile barrier precautions used. 7-Step Sterility Protocol followed. (cap, mask sterile gown, sterile gloves, large sterile sheet, hand hygiene, 2% chlorhexidine for cutaneous antisepsis)    Using doppler guidance, Left radial artery was attempted to be cannulated x 2 attempt(s) with marked difficulty. Unsuccessful attempt. Sterile Tegaderm placed. Plan was then to move forward with left femoral arterial line placement as below.     Errol Wood DO   9/21/2022 12:59 PM

## 2022-09-21 NOTE — CARDIO/PULMONARY
Another CODE BLUE event was called this afternoon, chest compressions were started and the patient was given IV epinephrine in the ICU. Prior to my arrival, the patient's daughter elected to pursue DNR and comfort care and stop resuscitation. After my arrival, I gave the patient's daughter my condolences. The ICU nursing staff will continue to perform the duties and the  is there for support as well.       Mir Naylor DO  [facility] ([facility])

## 2022-09-22 LAB
ATRIAL RATE: 76 BPM
CALCULATED R AXIS, ECG10: -156 DEGREES
CALCULATED T AXIS, ECG11: 10 DEGREES
DIAGNOSIS, 93000: NORMAL
Q-T INTERVAL, ECG07: 362 MS
QRS DURATION, ECG06: 112 MS
QTC CALCULATION (BEZET), ECG08: 568 MS
VENTRICULAR RATE, ECG03: 148 BPM

## 2022-09-24 LAB
ATRIAL RATE: 73 BPM
CALCULATED R AXIS, ECG10: 127 DEGREES
CALCULATED T AXIS, ECG11: 9 DEGREES
DIAGNOSIS, 93000: NORMAL
Q-T INTERVAL, ECG07: 298 MS
QRS DURATION, ECG06: 104 MS
QTC CALCULATION (BEZET), ECG08: 395 MS
VENTRICULAR RATE, ECG03: 106 BPM

## 2022-09-27 LAB
BACTERIA SPEC CULT: NORMAL
SPECIAL REQUESTS,SREQ: NORMAL

## 2022-09-28 ENCOUNTER — TELEPHONE (OUTPATIENT)
Dept: SURGERY | Age: 57
End: 2022-09-28

## 2022-09-28 NOTE — TELEPHONE ENCOUNTER
Justin from a  home called stating that she sent a death cerificate to Dr. Leeanna Castañeda. If you need to reach call [phone].

## 2022-09-28 NOTE — DISCHARGE SUMMARY
Date of admission: September 19, 2022    Date of discharge: September 21, 2022    Admission diagnosis: Constipation    Discharge diagnoses: Renal failure, fulminant liver failure, death    Hospital course patient is a 42-year-old female who presents to the emergency department with complaints of abdominal pain and distention. She has been taking Imodium continuously for up to 3 to 4 days prior to presentation because she is staying at a friend's house and was unable to use the bathroom. She has not had a bowel movement in 3 days. Nurse department she was found to be in acute kidney injury with a creatinine of 1.82. Her bicarb is also low at 15. CT scan demonstrated dilated fluid-filled proximal colon with a moderate amount of stool in the sigmoid colon and rectum. Small volume ascites noted. The patient past medical history seen for history of [place] with cirrhosis. She does have a history of diabetes and fibromyalgia. She was admitted to the hospital however on hospital day 1 she became hypotensive and tachycardic and was transferred to the ICU. Her electrolytes were markedly abnormal secondary to her kidney injury and nephrology was consulted. On hospital day 2 her creatinine was up to 4.5, potassium 8.9, bicarb 14. She had a repeat CT scan the night of hospital day 1 which revealed no change. Her liver enzymes on postop day 2 were AST greater than 2000  alk phos 379. Lactic acid was 14.3. CK was 201,000. Ammonia was 239. Emergent dialysis catheter was placed and she was started on dialysis however not tolerated. She went to cardiopulmonary arrest however there is no ROSC. Patient was patient was declared dead.